# Patient Record
Sex: MALE | Race: WHITE | NOT HISPANIC OR LATINO | Employment: OTHER | URBAN - METROPOLITAN AREA
[De-identification: names, ages, dates, MRNs, and addresses within clinical notes are randomized per-mention and may not be internally consistent; named-entity substitution may affect disease eponyms.]

---

## 2020-10-13 ENCOUNTER — TELEPHONE (OUTPATIENT)
Dept: SCHEDULING | Facility: IMAGING CENTER | Age: 65
End: 2020-10-13

## 2020-10-28 ENCOUNTER — OFFICE VISIT (OUTPATIENT)
Dept: MEDICAL GROUP | Facility: LAB | Age: 65
End: 2020-10-28
Payer: COMMERCIAL

## 2020-10-28 VITALS
HEART RATE: 63 BPM | OXYGEN SATURATION: 96 % | SYSTOLIC BLOOD PRESSURE: 120 MMHG | TEMPERATURE: 98.9 F | DIASTOLIC BLOOD PRESSURE: 68 MMHG | WEIGHT: 225 LBS | HEIGHT: 74 IN | BODY MASS INDEX: 28.88 KG/M2 | RESPIRATION RATE: 13 BRPM

## 2020-10-28 DIAGNOSIS — I10 ESSENTIAL HYPERTENSION: ICD-10-CM

## 2020-10-28 DIAGNOSIS — E78.2 MIXED HYPERLIPIDEMIA: ICD-10-CM

## 2020-10-28 DIAGNOSIS — Z76.89 ENCOUNTER TO ESTABLISH CARE: ICD-10-CM

## 2020-10-28 DIAGNOSIS — I25.10 CORONARY ARTERY DISEASE INVOLVING NATIVE CORONARY ARTERY OF NATIVE HEART WITHOUT ANGINA PECTORIS: ICD-10-CM

## 2020-10-28 PROCEDURE — 99203 OFFICE O/P NEW LOW 30 MIN: CPT | Performed by: FAMILY MEDICINE

## 2020-10-28 RX ORDER — ASPIRIN 81 MG/1
81 TABLET ORAL DAILY
Qty: 90 TAB | Refills: 3
Start: 2020-10-28 | End: 2023-01-23 | Stop reason: SDUPTHER

## 2020-10-28 RX ORDER — ATORVASTATIN CALCIUM 40 MG/1
40 TABLET, FILM COATED ORAL NIGHTLY
COMMUNITY
End: 2020-11-17 | Stop reason: SDUPTHER

## 2020-10-28 RX ORDER — LISINOPRIL 5 MG/1
5 TABLET ORAL DAILY
COMMUNITY
End: 2020-11-17 | Stop reason: SDUPTHER

## 2020-10-28 SDOH — HEALTH STABILITY: MENTAL HEALTH: HOW OFTEN DO YOU HAVE A DRINK CONTAINING ALCOHOL?: MONTHLY OR LESS

## 2020-10-28 SDOH — HEALTH STABILITY: MENTAL HEALTH: HOW MANY STANDARD DRINKS CONTAINING ALCOHOL DO YOU HAVE ON A TYPICAL DAY?: 1 OR 2

## 2020-10-28 SDOH — HEALTH STABILITY: MENTAL HEALTH: HOW OFTEN DO YOU HAVE 6 OR MORE DRINKS ON ONE OCCASION?: NEVER

## 2020-10-28 ASSESSMENT — ENCOUNTER SYMPTOMS
FEVER: 0
CHILLS: 0
PALPITATIONS: 0
SHORTNESS OF BREATH: 0
BLURRED VISION: 0
VOMITING: 0
WHEEZING: 0
ABDOMINAL PAIN: 0
DIARRHEA: 0
NERVOUS/ANXIOUS: 0
HEADACHES: 0
NAUSEA: 0
DEPRESSION: 0
CONSTIPATION: 0
DIZZINESS: 0

## 2020-10-28 ASSESSMENT — PATIENT HEALTH QUESTIONNAIRE - PHQ9: CLINICAL INTERPRETATION OF PHQ2 SCORE: 0

## 2020-10-28 NOTE — LETTER
[facility]  Russell Lobo M.D.  [address]  [place] [address]  Fax: [phone]   Authorization for Release/Disclosure of   Protected Health Information   Name: GALA FELDMAN : 1955 SSN: [id number]   Address: [address] [place] [address] [address] [place] [address] Phone:    [phone] (home)    I authorize the entity listed below to release/disclose the PHI below to:   [facility]/Russell Lobo M.D. and Russell Lobo M.D.   Provider or Entity Name: [facility], Zip:[address] [place] [address]     [phone]    Fax:(   Reason for request: continuity of care   Information to be released:    [  ] LAST COLONOSCOPY,  including any PATH REPORT and follow-up  [  ] LAST FIT/COLOGUARD RESULT [  ] LAST DEXA  [  ] LAST MAMMOGRAM  [  ] LAST PAP  [  ] LAST LABS [  ] RETINA EXAM REPORT  [  ] IMMUNIZATION RECORDS  [ XXX ] Release all info      [  ] Check here and initial the line next to each item to release ALL health information INCLUDING  _____ Care and treatment for drug and / or alcohol abuse  _____ HIV testing, infection status, or AIDS  _____ Genetic Testing    DATES OF SERVICE OR TIME PERIOD TO BE DISCLOSED: _____________  I understand and acknowledge that:  * This Authorization may be revoked at any time by you in writing, except if your health information has already been used or disclosed.  * Your health information that will be used or disclosed as a result of you signing this authorization could be re-disclosed by the recipient. If this occurs, your re-disclosed health information may no longer be protected by State or Federal laws.  * You may refuse to sign this Authorization. Your refusal will not affect your ability to obtain treatment.  * This Authorization becomes effective upon signing and will  on (date) __________.      If no date is indicated, this Authorization will  one (1) year from the signature  date.    Name: Aldair Winter    Signature:   Date:     10/28/2020       PLEASE FAX REQUESTED RECORDS BACK TO: [phone]

## 2020-10-28 NOTE — PROGRESS NOTES
Aldair Winter is a 65 y.o. male here for   Chief Complaint   Patient presents with   • Establish Care     New to [facility]    • Heart Problem     Hx of heart attack (2019)       HPI:  Aldair is a very pleasant 65 y.o. male.     #Establish care   -Reviewed all past medical history, family history, social history.  -Reviewed all screening/vaccinations: Patient states that at [facility] had influenza, zoster, Tdap completed.  Colonoscopies completed 3 years ago.  Would not have records at this time but will request records.  -Diet and Exercise: Appropriate for age.  -Tobacco, alcohol, recreational drug use: Patient alcohol use.  Denies any tobacco, recreational drug use.  -Sexually active: Yes, monogamous with wife, no concerns regarding relationship.  -Occupation: Retired .    #CAD   -MI in Jan 2019, Stent was placed in LAD. Placed on Asprin, metoprolol, anti-platelet.  Antiplatelet was discontinued after 1 year.  Patient was experiencing significant side effects from the Toprol including lightheadedness, dizziness, hypotension, bradycardia.  -Since his MI back in 2019 has been working on better diet, exercise regimen.  Has lost some weight.  -Patient is on aspirin 81 mg daily as well as lisinopril for high blood pressure.  Is compliant with medications.  Denies any chest pain, shortness of breath, lightheadedness.    #Hypertension   #Chronic condition, new to me.  Currently being treated lisinopril 5 mg daily.  Compliant medication, denies any side effects including lightheadedness, dizziness, headache, change in vision, increased fatigue, syncope presyncopal episodes.  -Working on low-salt diet and daily exercise.    #Mixed hyperlipidemia:  -Chronic condition, new to me.  Currently taking Lipitor 40 mg daily.  Compliant medication, denies any side effects include increased fatigue, myalgias, headaches.      Current medicines (including changes today)  Current Outpatient Medications  "  Medication Sig Dispense Refill   • atorvastatin (LIPITOR) 40 MG Tab Take 40 mg by mouth every evening.     • ASPIRIN 81 PO Take 1 tablet by mouth every day.     • lisinopril (PRINIVIL) 5 MG Tab Take 5 mg by mouth every day.       No current facility-administered medications for this visit.      He  has a past medical history of Heart attack (HCC) (2019).  He  has no past surgical history on file.  Social History     Tobacco Use   • Smoking status: Never Smoker   • Smokeless tobacco: Never Used   Substance Use Topics   • Alcohol use: Yes     Frequency: Monthly or less     Drinks per session: 1 or 2     Binge frequency: Never   • Drug use: Not Currently     Social History     Social History Narrative   • Not on file     Family History   Problem Relation Age of Onset   • Depression Mother    • Cancer Father         Prostate cancer   • No Known Problems Sister    • No Known Problems Brother    • No Known Problems Sister      Family Status   Relation Name Status   • Mo  Alive   • Fa   at age 64   • Sis  Alive   • Bro  Alive   • Sis  Alive         ROS  Review of Systems   Constitutional: Negative for chills and fever.   HENT: Negative for hearing loss.    Eyes: Negative for blurred vision.   Respiratory: Negative for shortness of breath and wheezing.    Cardiovascular: Negative for chest pain and palpitations.   Gastrointestinal: Negative for abdominal pain, constipation, diarrhea, nausea and vomiting.   Neurological: Negative for dizziness and headaches.   Psychiatric/Behavioral: Negative for depression. The patient is not nervous/anxious.         Objective:     /68 (BP Location: Right arm, Patient Position: Sitting, BP Cuff Size: Adult)   Pulse 63   Temp 37.2 °C (98.9 °F) (Temporal)   Resp 13   Ht 1.87 m (6' 1.62\")   Wt 102.1 kg (225 lb)   SpO2 96%  Body mass index is 29.19 kg/m².  Physical Exam:    Constitutional: Alert, no distress.  Skin: Warm, dry, good turgor, no rashes in visible areas.  Eye: " Equal, round and reactive, conjunctiva clear, lids normal.  ENMT: Lips without lesions, good dentition, oropharynx clear. TM's pearly gray with normal light reflexes bilaterally  Neck: Trachea midline, no masses, no thyromegaly. No cervical or supraclavicular lymphadenopathy.  Respiratory: Unlabored respiratory effort, lungs clear to auscultation bilaterally, no wheezes, rales, or ronchi.  Cardiovascular: Normal S1, S2, RRR, no murmur, no edema.  Abdomen: Soft, non-tender, no masses, no hepatosplenomegaly.  Psych: Alert and oriented x3, normal affect and mood.        Assessment and Plan:   The following treatment plan was discussed    1. Encounter to establish care  -All questions concerns were answered at this time.  -Vaccinations/screenings needed at this time: Unsure given paucity of records.  We will do records request with [facility].  -Labs reviewed, will order new labs as below.  -Discussed the importance of a healthy, Mediterranean style diet, routine exercise regimen.  -Discussed general safety measures including seatbelts, helmets, avoidance of smoking, sunscreen, hydration.  -Follow-up for general physical exam on a yearly basis, sooner if needed.  - Comp Metabolic Panel; Future  - CBC WITHOUT DIFFERENTIAL; Future    2. Coronary artery disease involving native coronary artery of native heart without angina pectoris  -Status: Stable.  Continue lisinopril, aspirin.  -Continue with appropriate diet and exercise regimen.  -Return cautions were given, follow-up as needed.  - aspirin 81 MG EC tablet; Take 1 Tab by mouth every day.  Dispense: 90 Tab; Refill: 3    3. Essential hypertension  -Status: Stable we will continue with lisinopril.  Will check labs as below.  -Discussed the importance of healthy diet and exercise.  - Comp Metabolic Panel; Future  - CBC WITHOUT DIFFERENTIAL; Future    4. Mixed hyperlipidemia  -Status: Stable.  Will continue with atorvastatin.  Will check cholesterol levels at this time.   Discussed the importance of a healthy diet and exercise regimen.  - Lipid Profile; Future      Records requested.  Followup: Return in about 1 year (around 10/28/2021), or if symptoms worsen or fail to improve.         This note was created using voice recognition software. I have made every reasonable attempt to correct errors, however, I do anticipate some grammatical errors.

## 2020-10-29 ENCOUNTER — TELEPHONE (OUTPATIENT)
Dept: MEDICAL GROUP | Facility: LAB | Age: 65
End: 2020-10-29

## 2020-10-29 NOTE — TELEPHONE ENCOUNTER
Phone Number Called: [phone]    Call outcome: Spoke to medical records department at [facility]    Message: Called and spoke to medical records gave them our mailing address to the medical group office.

## 2020-10-31 ENCOUNTER — HOSPITAL ENCOUNTER (OUTPATIENT)
Dept: LAB | Facility: MEDICAL CENTER | Age: 65
End: 2020-10-31
Attending: FAMILY MEDICINE
Payer: COMMERCIAL

## 2020-10-31 DIAGNOSIS — Z76.89 ENCOUNTER TO ESTABLISH CARE: ICD-10-CM

## 2020-10-31 DIAGNOSIS — E78.2 MIXED HYPERLIPIDEMIA: ICD-10-CM

## 2020-10-31 DIAGNOSIS — I10 ESSENTIAL HYPERTENSION: ICD-10-CM

## 2020-10-31 LAB
ALBUMIN SERPL BCP-MCNC: 4.6 G/DL (ref 3.2–4.9)
ALBUMIN/GLOB SERPL: 2 G/DL
ALP SERPL-CCNC: 52 U/L (ref 30–99)
ALT SERPL-CCNC: 47 U/L (ref 2–50)
ANION GAP SERPL CALC-SCNC: 10 MMOL/L (ref 7–16)
AST SERPL-CCNC: 31 U/L (ref 12–45)
BILIRUB SERPL-MCNC: 1.6 MG/DL (ref 0.1–1.5)
BUN SERPL-MCNC: 18 MG/DL (ref 8–22)
CALCIUM SERPL-MCNC: 9.9 MG/DL (ref 8.5–10.5)
CHLORIDE SERPL-SCNC: 101 MMOL/L (ref 96–112)
CHOLEST SERPL-MCNC: 111 MG/DL (ref 100–199)
CO2 SERPL-SCNC: 27 MMOL/L (ref 20–33)
CREAT SERPL-MCNC: 0.96 MG/DL (ref 0.5–1.4)
ERYTHROCYTE [DISTWIDTH] IN BLOOD BY AUTOMATED COUNT: 46.9 FL (ref 35.9–50)
FASTING STATUS PATIENT QL REPORTED: NORMAL
GLOBULIN SER CALC-MCNC: 2.3 G/DL (ref 1.9–3.5)
GLUCOSE SERPL-MCNC: 108 MG/DL (ref 65–99)
HCT VFR BLD AUTO: 47.9 % (ref 42–52)
HDLC SERPL-MCNC: 45 MG/DL
HGB BLD-MCNC: 16.5 G/DL (ref 14–18)
LDLC SERPL CALC-MCNC: 56 MG/DL
MCH RBC QN AUTO: 35.2 PG (ref 27–33)
MCHC RBC AUTO-ENTMCNC: 34.4 G/DL (ref 33.7–35.3)
MCV RBC AUTO: 102.1 FL (ref 81.4–97.8)
PLATELET # BLD AUTO: 198 K/UL (ref 164–446)
PMV BLD AUTO: 9.8 FL (ref 9–12.9)
POTASSIUM SERPL-SCNC: 4.7 MMOL/L (ref 3.6–5.5)
PROT SERPL-MCNC: 6.9 G/DL (ref 6–8.2)
RBC # BLD AUTO: 4.69 M/UL (ref 4.7–6.1)
SODIUM SERPL-SCNC: 138 MMOL/L (ref 135–145)
TRIGL SERPL-MCNC: 52 MG/DL (ref 0–149)
WBC # BLD AUTO: 5.8 K/UL (ref 4.8–10.8)

## 2020-10-31 PROCEDURE — 36415 COLL VENOUS BLD VENIPUNCTURE: CPT

## 2020-10-31 PROCEDURE — 80053 COMPREHEN METABOLIC PANEL: CPT

## 2020-10-31 PROCEDURE — 85027 COMPLETE CBC AUTOMATED: CPT

## 2020-10-31 PROCEDURE — 80061 LIPID PANEL: CPT

## 2020-11-17 DIAGNOSIS — E78.2 MIXED HYPERLIPIDEMIA: ICD-10-CM

## 2020-11-17 DIAGNOSIS — I10 ESSENTIAL HYPERTENSION: ICD-10-CM

## 2020-11-17 NOTE — TELEPHONE ENCOUNTER
Received request via: Patient    Was the patient seen in the last year in this department? Yes LOV 10/28/2020    Does the patient have an active prescription (recently filled or refills available) for medication(s) requested? No

## 2020-11-17 NOTE — TELEPHONE ENCOUNTER
Prescription Question    Aldair Winter Russell T, M.D. 4 minutes ago (10:23 AM)        I am ready to fill my 2 percriptions . They do not seem to be available for refill at this time.  C an you approve refills on the Lipitor and Lisinipril.

## 2020-11-18 RX ORDER — LISINOPRIL 5 MG/1
5 TABLET ORAL DAILY
Qty: 90 TAB | Refills: 3 | Status: SHIPPED | OUTPATIENT
Start: 2020-11-18 | End: 2021-02-16

## 2020-11-18 RX ORDER — ATORVASTATIN CALCIUM 40 MG/1
40 TABLET, FILM COATED ORAL DAILY
Qty: 90 TAB | Refills: 3 | Status: SHIPPED | OUTPATIENT
Start: 2020-11-18 | End: 2021-02-16

## 2021-01-04 ENCOUNTER — TELEPHONE (OUTPATIENT)
Dept: MEDICAL GROUP | Facility: LAB | Age: 66
End: 2021-01-04

## 2021-01-04 DIAGNOSIS — E78.2 MIXED HYPERLIPIDEMIA: ICD-10-CM

## 2021-01-04 DIAGNOSIS — I10 ESSENTIAL HYPERTENSION: ICD-10-CM

## 2021-01-04 RX ORDER — ATORVASTATIN CALCIUM 40 MG/1
40 TABLET, FILM COATED ORAL DAILY
Qty: 90 TAB | Refills: 3 | Status: CANCELLED | OUTPATIENT
Start: 2021-01-04 | End: 2021-04-04

## 2021-01-04 RX ORDER — LISINOPRIL 5 MG/1
5 TABLET ORAL DAILY
Qty: 90 TAB | Refills: 3 | Status: CANCELLED | OUTPATIENT
Start: 2021-01-04 | End: 2021-04-04

## 2021-01-04 NOTE — TELEPHONE ENCOUNTER
----- Message from Aldair Winter sent at 1/3/2021  8:52 AM PST -----  Regarding: Prescription Question  Contact: [phone]  Hello,  I picked up two prescriptions in November and according to this website both were for a 90 day refill. However, this was not the case. I only received a 30 day supply and now have 3 atorvastavin pills remaining and 10 lisinopril pills remaining.   Can you please approve refills and change the prescription to 90 day supply and mail order?     Thank you,  Jean Winter

## 2021-02-01 ENCOUNTER — APPOINTMENT (OUTPATIENT)
Dept: RADIOLOGY | Facility: MEDICAL CENTER | Age: 66
End: 2021-02-01
Attending: EMERGENCY MEDICINE
Payer: COMMERCIAL

## 2021-02-01 ENCOUNTER — HOSPITAL ENCOUNTER (EMERGENCY)
Facility: MEDICAL CENTER | Age: 66
End: 2021-02-01
Attending: EMERGENCY MEDICINE
Payer: COMMERCIAL

## 2021-02-01 VITALS
SYSTOLIC BLOOD PRESSURE: 140 MMHG | RESPIRATION RATE: 17 BRPM | HEIGHT: 73 IN | TEMPERATURE: 98.2 F | DIASTOLIC BLOOD PRESSURE: 87 MMHG | OXYGEN SATURATION: 93 % | BODY MASS INDEX: 31.18 KG/M2 | HEART RATE: 58 BPM | WEIGHT: 235.23 LBS

## 2021-02-01 DIAGNOSIS — S16.1XXA STRAIN OF NECK MUSCLE, INITIAL ENCOUNTER: ICD-10-CM

## 2021-02-01 PROCEDURE — 72125 CT NECK SPINE W/O DYE: CPT

## 2021-02-01 PROCEDURE — 99283 EMERGENCY DEPT VISIT LOW MDM: CPT

## 2021-02-01 ASSESSMENT — FIBROSIS 4 INDEX: FIB4 SCORE: 1.44

## 2021-02-01 NOTE — ED PROVIDER NOTES
ED Provider Note    CHIEF COMPLAINT  Chief Complaint   Patient presents with   • Neck Pain     header skiing this am on the hard pack, no numbness or tingling in his arms       HPI  Aldair Winter is a 65 y.o. male who presents with neck pain.  The patient wrecked skiing this morning.  He states that he fell forward and struck his head and had a hyperflexion injury to his neck.  He presents with neck discomfort.  He did have a helmet on and does not have a headache.  He states he has pain diffusely located throughout the cervical spine but mostly in the left paraspinal muscles.  The patient does not have any paresthesias nor functional loss of his extremities.  He is unaware of any other injuries.  Does not have any radicular pain.    REVIEW OF SYSTEMS  See HPI for further details. All other systems are negative.     PAST MEDICAL HISTORY  Past Medical History:   Diagnosis Date   • Heart attack (HCC) 2019       FAMILY HISTORY  [unfilled]    SOCIAL HISTORY  Social History     Socioeconomic History   • Marital status:      Spouse name: Not on file   • Number of children: Not on file   • Years of education: Not on file   • Highest education level: Not on file   Occupational History   • Not on file   Social Needs   • Financial resource strain: Not on file   • Food insecurity     Worry: Not on file     Inability: Not on file   • Transportation needs     Medical: Not on file     Non-medical: Not on file   Tobacco Use   • Smoking status: Never Smoker   • Smokeless tobacco: Never Used   Substance and Sexual Activity   • Alcohol use: Yes     Frequency: Monthly or less     Drinks per session: 1 or 2     Binge frequency: Never   • Drug use: Not Currently   • Sexual activity: Not on file   Lifestyle   • Physical activity     Days per week: Not on file     Minutes per session: Not on file   • Stress: Not on file   Relationships   • Social connections     Talks on phone: Not on file     Gets together: Not on file      "Attends Denominational service: Not on file     Active member of club or organization: Not on file     Attends meetings of clubs or organizations: Not on file     Relationship status: Not on file   • Intimate partner violence     Fear of current or ex partner: Not on file     Emotionally abused: Not on file     Physically abused: Not on file     Forced sexual activity: Not on file   Other Topics Concern   • Not on file   Social History Narrative   • Not on file       SURGICAL HISTORY  No past surgical history on file.    CURRENT MEDICATIONS  Home Medications    **Home medications have not yet been reviewed for this encounter**         ALLERGIES  No Known Allergies    PHYSICAL EXAM  VITAL SIGNS: /86   Pulse (!) 59   Temp 36.9 °C (98.4 °F) (Temporal)   Resp 16   Ht 1.854 m (6' 1\")   Wt 107 kg (235 lb 3.7 oz)   SpO2 96%   BMI 31.03 kg/m²       Constitutional: Mild acute distress, Non-toxic appearance.   HENT: Normocephalic, Atraumatic, Bilateral external ears normal, Oropharynx moist, No oral exudates, Nose normal.   Eyes: PERRLA, EOMI, Conjunctiva normal, No discharge.   Neck: Diffuse cervical discomfort without midline step-offs  Lymphatic: No lymphadenopathy noted.   Cardiovascular: Normal heart rate, Normal rhythm, No murmurs, No rubs, No gallops.   Thorax & Lungs: Normal breath sounds, No respiratory distress, No wheezing, No chest tenderness.   Abdomen: Bowel sounds normal, Soft, No tenderness, No masses, No pulsatile masses.   Skin: Warm, Dry, No erythema, No rash.   Back: No tenderness, No CVA tenderness.    Extremities: Intact distal pulses, No edema, No tenderness, No cyanosis, No clubbing.   Neurologic: GCS 15  Psychiatric: Affect normal, Judgment normal, Mood normal.     RADIOLOGY/PROCEDURES  CT-CSPINE WITHOUT PLUS RECONS   Final Result      1.  No acute fracture is identified.      2.  Multilevel degenerative disc disease and facet arthropathy.      3.  Atherosclerosis.            COURSE & MEDICAL " DECISION MAKING  Pertinent Labs & Imaging studies reviewed. (See chart for details)  This a 65-year-old gentleman who presents with cervical neck pain after a skiing accident.  CT scan was performed to make sure there is no evidence of a compression deformity.  The imaging studies were negative.  Clinically does not have any evidence of ligamental instability.  I suspect this is more from a flexion soft tissue injury.  The patient is neurologically intact at this time.  The patient will utilize ice, Motrin, and Tylenol.  If he develops weakness to his upper extremities or increased pain he will return for repeat examination.  Clinically does not have any evidence of a significant head injury.    FINAL IMPRESSION  1.  Cervical strain    Disposition  Patient will be discharged in stable condition    Electronically signed by: Antonino Griffin M.D., 2/1/2021 3:28 PM

## 2021-02-01 NOTE — ED TRIAGE NOTES
Chief Complaint   Patient presents with   • Neck Pain     header skiing this am on the hard pack, no numbness or tingling in his arms   Hard collar applied in triage. Then the hard collar removed as this pt could not handle it.

## 2021-02-01 NOTE — DISCHARGE INSTRUCTIONS
Utilize ice, Motrin, and Tylenol as discussed    Turn for increased pain or weakness to your upper extremities

## 2021-02-01 NOTE — ED NOTES
Discharge instructions given and discussed. Pt educated to come back to ER for new or worsening symptoms and follow up with PCP as instructed. Pt verbalized understanding. VSS. Pt  Discharged in stable condition.

## 2021-03-15 DIAGNOSIS — Z23 NEED FOR VACCINATION: ICD-10-CM

## 2021-03-23 ENCOUNTER — IMMUNIZATION (OUTPATIENT)
Dept: FAMILY PLANNING/WOMEN'S HEALTH CLINIC | Facility: IMMUNIZATION CENTER | Age: 66
End: 2021-03-23
Attending: INTERNAL MEDICINE
Payer: COMMERCIAL

## 2021-03-23 DIAGNOSIS — Z23 NEED FOR VACCINATION: ICD-10-CM

## 2021-03-23 DIAGNOSIS — Z23 ENCOUNTER FOR VACCINATION: Primary | ICD-10-CM

## 2021-03-23 PROCEDURE — 91300 PFIZER SARS-COV-2 VACCINE: CPT | Performed by: INTERNAL MEDICINE

## 2021-03-23 PROCEDURE — 0001A PFIZER SARS-COV-2 VACCINE: CPT | Performed by: INTERNAL MEDICINE

## 2021-03-31 ENCOUNTER — HOSPITAL ENCOUNTER (OUTPATIENT)
Dept: LAB | Facility: MEDICAL CENTER | Age: 66
End: 2021-03-31
Attending: FAMILY MEDICINE
Payer: COMMERCIAL

## 2021-03-31 ENCOUNTER — OFFICE VISIT (OUTPATIENT)
Dept: MEDICAL GROUP | Facility: LAB | Age: 66
End: 2021-03-31
Payer: COMMERCIAL

## 2021-03-31 VITALS
HEIGHT: 74 IN | OXYGEN SATURATION: 97 % | WEIGHT: 232 LBS | SYSTOLIC BLOOD PRESSURE: 106 MMHG | BODY MASS INDEX: 29.77 KG/M2 | HEART RATE: 66 BPM | DIASTOLIC BLOOD PRESSURE: 62 MMHG | TEMPERATURE: 98 F | RESPIRATION RATE: 13 BRPM

## 2021-03-31 DIAGNOSIS — R73.09 ELEVATED RANDOM BLOOD GLUCOSE LEVEL: ICD-10-CM

## 2021-03-31 DIAGNOSIS — R21 RASH AND NONSPECIFIC SKIN ERUPTION: ICD-10-CM

## 2021-03-31 DIAGNOSIS — Z12.5 ENCOUNTER FOR SCREENING FOR MALIGNANT NEOPLASM OF PROSTATE: ICD-10-CM

## 2021-03-31 DIAGNOSIS — Z12.12 SCREENING FOR COLORECTAL CANCER: ICD-10-CM

## 2021-03-31 DIAGNOSIS — Z12.11 SCREENING FOR COLORECTAL CANCER: ICD-10-CM

## 2021-03-31 LAB
EST. AVERAGE GLUCOSE BLD GHB EST-MCNC: 117 MG/DL
HBA1C MFR BLD: 5.7 % (ref 4–5.6)
PSA SERPL-MCNC: 0.25 NG/ML (ref 0–4)

## 2021-03-31 PROCEDURE — 83036 HEMOGLOBIN GLYCOSYLATED A1C: CPT

## 2021-03-31 PROCEDURE — 84153 ASSAY OF PSA TOTAL: CPT

## 2021-03-31 PROCEDURE — 99214 OFFICE O/P EST MOD 30 MIN: CPT | Performed by: FAMILY MEDICINE

## 2021-03-31 PROCEDURE — 36415 COLL VENOUS BLD VENIPUNCTURE: CPT

## 2021-03-31 RX ORDER — ATORVASTATIN CALCIUM 40 MG/1
40 TABLET, FILM COATED ORAL
COMMUNITY
Start: 2021-03-04 | End: 2021-04-22 | Stop reason: SDUPTHER

## 2021-03-31 RX ORDER — TRIAMCINOLONE ACETONIDE 1 MG/G
CREAM TOPICAL
Qty: 45 G | Refills: 3 | Status: SHIPPED
Start: 2021-03-31 | End: 2021-07-28

## 2021-03-31 RX ORDER — LISINOPRIL 5 MG/1
5 TABLET ORAL
COMMUNITY
Start: 2021-03-04 | End: 2021-04-22 | Stop reason: SDUPTHER

## 2021-03-31 ASSESSMENT — ENCOUNTER SYMPTOMS
SHORTNESS OF BREATH: 0
PALPITATIONS: 0
FEVER: 0
WHEEZING: 0
CHILLS: 0

## 2021-03-31 ASSESSMENT — FIBROSIS 4 INDEX: FIB4 SCORE: 1.44

## 2021-03-31 NOTE — PROGRESS NOTES
"Subjective:   Aldair Winter is a 65 y.o. male here today for   Chief Complaint   Patient presents with   • Rash     X 3 weeks, Bilateral on thighs using hydrocloride and zinc cream        #Rash:  -For the last 3 weeks patient has noticed a erythematous, pruritic patch located inner aspect of his upper right leg and he has been using over-the-counter hydrochlorothiazide and zinc cream which is only helped slightly.  He states the area is extremely pruritic, slightly worse when rashing.  He has moved from [facility] and has noticed his skin becoming extremely more dry since moving to [place].    #Elevated blood glucose level:  -No previous appointment labs were completed showing elevated blood glucose level.  Him and his wife been working on improving diet and exercise.  Is asymptomatic at this time.  Requesting repeat blood work.    #Health maintenance:  -Patient is due for PSA, colonoscopy which she will complete at this time.      No Known Allergies      Current medicines (including changes today)  Current Outpatient Medications   Medication Sig Dispense Refill   • atorvastatin (LIPITOR) 40 MG Tab Take 40 mg by mouth every day.     • lisinopril (PRINIVIL) 5 MG Tab Take 5 mg by mouth every day.     • aspirin 81 MG EC tablet Take 1 Tab by mouth every day. 90 Tab 3     No current facility-administered medications for this visit.     He  has a past medical history of Heart attack (HCC) (2019).    ROS   Review of Systems   Constitutional: Negative for chills and fever.   Respiratory: Negative for shortness of breath and wheezing.    Cardiovascular: Negative for chest pain and palpitations.   Skin: Positive for itching and rash.        Objective:     Physical Exam:  /62   Pulse 66   Temp 36.7 °C (98 °F) (Temporal)   Resp 13   Ht 1.87 m (6' 1.62\")   Wt 105 kg (232 lb)   SpO2 97%  Body mass index is 30.09 kg/m².   Constitutional: Alert, no distress.  Skin: Warm, dry, good turgor.  Erythematous patch on the " inner aspect of the upper right leg measuring 6 x 3 cm.  Excoriation marks were also noted.  Respiratory: Unlabored respiratory effortCardiovascular: Normal S1, S2, no murmur, no edema.  Psych: Alert and oriented x3, normal affect and mood.    Assessment and Plan:     1. Rash and nonspecific skin eruption  -Uncertain etiology at this time; however, given presenting signs and symptoms is controlled possible eczema.  We will treated the this time with prescription steroid cream to use twice daily.  Discussed use of emollient creams as well.  Patient will follow up in 1 to 2 weeks if no improvement is noted.  - triamcinolone acetonide (KENALOG) 0.1 % Cream; Apply to the affected area on leg twice a day for 1-2 weeks.  Dispense: 45 g; Refill: 3    2. Encounter for screening for malignant neoplasm of prostate  - PROSTATE SPECIFIC AG SCREENING; Future    3. Elevated random blood glucose level  -Continue work on appropriate diet, exercise regimen.  Will recheck hemoglobin A1c at this time.  - HEMOGLOBIN A1C; Future    4. Screening for colorectal cancer  - REFERRAL TO GI FOR COLONOSCOPY      Followup: Return if symptoms worsen or fail to improve.         PLEASE NOTE: This dictation was created using voice recognition software. I have made every reasonable attempt to correct obvious errors, but I expect that there are errors of grammar and possibly content that I did not discover before finalizing the note.

## 2021-04-14 ENCOUNTER — OFFICE VISIT (OUTPATIENT)
Dept: MEDICAL GROUP | Facility: LAB | Age: 66
End: 2021-04-14
Payer: COMMERCIAL

## 2021-04-14 VITALS
HEART RATE: 56 BPM | HEIGHT: 74 IN | WEIGHT: 232 LBS | SYSTOLIC BLOOD PRESSURE: 104 MMHG | DIASTOLIC BLOOD PRESSURE: 62 MMHG | RESPIRATION RATE: 13 BRPM | TEMPERATURE: 97 F | BODY MASS INDEX: 29.77 KG/M2 | OXYGEN SATURATION: 97 %

## 2021-04-14 DIAGNOSIS — I25.10 CORONARY ARTERY DISEASE INVOLVING NATIVE CORONARY ARTERY OF NATIVE HEART WITHOUT ANGINA PECTORIS: ICD-10-CM

## 2021-04-14 DIAGNOSIS — R73.03 PREDIABETES: ICD-10-CM

## 2021-04-14 PROCEDURE — 99213 OFFICE O/P EST LOW 20 MIN: CPT | Performed by: FAMILY MEDICINE

## 2021-04-14 ASSESSMENT — ENCOUNTER SYMPTOMS
WHEEZING: 0
FEVER: 0
PALPITATIONS: 0
CHILLS: 0
HEARTBURN: 0
HEADACHES: 0
SHORTNESS OF BREATH: 0
DIZZINESS: 0
BLURRED VISION: 0
NERVOUS/ANXIOUS: 0
DEPRESSION: 0

## 2021-04-14 ASSESSMENT — FIBROSIS 4 INDEX: FIB4 SCORE: 1.44

## 2021-04-14 ASSESSMENT — PATIENT HEALTH QUESTIONNAIRE - PHQ9: CLINICAL INTERPRETATION OF PHQ2 SCORE: 0

## 2021-04-14 NOTE — PATIENT INSTRUCTIONS

## 2021-04-14 NOTE — PROGRESS NOTES
Subjective:   Aldair Winter is a 65 y.o. male here today for   Chief Complaint   Patient presents with   • Nutrition Counseling       #Elevated hemoglobin A1c:  -Patient has had a recently elevated hemoglobin A1c of 5.7% seen in the prediabetic category.  He states him and his wife both have some issues with elevated blood glucose have been working on improving diet and exercise regimen.  He states he has been doing aerobic activity 30 to 40 minutes daily.  Denies any fatigue, polydipsia, polyuria, or any other symptoms at this time.  He is here today to discuss nutrition counseling.    #CAD:  -Status post stent placement 2019.  Currently on aspirin, Lipitor daily.  He is doing well, denies any symptoms at this time including chest pain, shortness of breath, dizziness, lightheadedness.  He is currently not following up with a cardiologist but feels like he is doing much better after cardiac rehab and continue to work on weight loss.  He has no questions or concerns at this time.      No Known Allergies      Current medicines (including changes today)  Current Outpatient Medications   Medication Sig Dispense Refill   • atorvastatin (LIPITOR) 40 MG Tab Take 40 mg by mouth every day.     • lisinopril (PRINIVIL) 5 MG Tab Take 5 mg by mouth every day.     • triamcinolone acetonide (KENALOG) 0.1 % Cream Apply to the affected area on leg twice a day for 1-2 weeks. 45 g 3   • aspirin 81 MG EC tablet Take 1 Tab by mouth every day. 90 Tab 3     No current facility-administered medications for this visit.     He  has a past medical history of Heart attack (HCC) (2019).    ROS   Review of Systems   Constitutional: Negative for chills and fever.   HENT: Negative for hearing loss.    Eyes: Negative for blurred vision.   Respiratory: Negative for shortness of breath and wheezing.    Cardiovascular: Negative for chest pain and palpitations.   Gastrointestinal: Negative for heartburn.   Neurological: Negative for dizziness and  "headaches.   Psychiatric/Behavioral: Negative for depression. The patient is not nervous/anxious.         Objective:     Physical Exam:  /62   Pulse (!) 56   Temp 36.1 °C (97 °F) (Temporal)   Resp 13   Ht 1.87 m (6' 1.62\")   Wt 105 kg (232 lb)   SpO2 97%  Body mass index is 30.09 kg/m².   Constitutional: Alert, no distress.  Skin: Warm, dry, good turgor, no rashes in visible areas.  Eye: Equal, round and reactive, conjunctiva clear, lids normal.  ENMT: TM's clear bilaterally, lips without lesions  Respiratory: Unlabored respiratory effort, no audible wheezingPsych: Alert and oriented x3, normal affect and mood.    Assessment and Plan:     1. Prediabetes  -Stable.  -Discussed appropriate low-carb, high-protein, high-fiber diet and limiting carbohydrates to 1-2 servings of meal.  Also discussed working on calorie restriction for weight loss.  Reviewed all labs and vitals with patient.  Also discussed most continuation of daily aerobic activity.  We will recheck hemoglobin A1c in 4 to 5 months, will follow up with patient with results.  -Patient will follow up with the is not seen weight loss or changes in diet that he was looking for.  - HEMOGLOBIN A1C; Future    2. Coronary artery disease involving native coronary artery of native heart without angina pectoris  -Stable.  We will continue treatment with aspirin, atorvastatin.  Patient is asymptomatic at this time.  I discussed the possibility of referring to cardiology for further follow-up; however, patient declined at this time and will continue following up with me on a regular basis.  Strict return precautions were given, patient will follow-up as needed.      Followup: Return in about 6 months (around 10/14/2021).         PLEASE NOTE: This dictation was created using voice recognition software. I have made every reasonable attempt to correct obvious errors, but I expect that there are errors of grammar and possibly content that I did not discover " before finalizing the note.

## 2021-04-15 ENCOUNTER — IMMUNIZATION (OUTPATIENT)
Dept: FAMILY PLANNING/WOMEN'S HEALTH CLINIC | Facility: IMMUNIZATION CENTER | Age: 66
End: 2021-04-15
Attending: INTERNAL MEDICINE
Payer: COMMERCIAL

## 2021-04-15 DIAGNOSIS — Z23 ENCOUNTER FOR VACCINATION: Primary | ICD-10-CM

## 2021-04-15 PROCEDURE — 91300 PFIZER SARS-COV-2 VACCINE: CPT | Performed by: INTERNAL MEDICINE

## 2021-04-15 PROCEDURE — 0002A PFIZER SARS-COV-2 VACCINE: CPT | Performed by: INTERNAL MEDICINE

## 2021-04-22 RX ORDER — LISINOPRIL 5 MG/1
5 TABLET ORAL
Qty: 90 TABLET | Refills: 3 | Status: SHIPPED | OUTPATIENT
Start: 2021-04-22 | End: 2022-04-27

## 2021-04-22 RX ORDER — ATORVASTATIN CALCIUM 40 MG/1
40 TABLET, FILM COATED ORAL
Qty: 90 TABLET | Refills: 3 | Status: SHIPPED | OUTPATIENT
Start: 2021-04-22 | End: 2022-04-27

## 2021-04-22 NOTE — TELEPHONE ENCOUNTER
Received request via: Pharmacy/ MAIL ORDER    Was the patient seen in the last year in this department? Yes  4/14/21  Does the patient have an active prescription (recently filled or refills available) for medication(s) requested? No

## 2021-04-22 NOTE — TELEPHONE ENCOUNTER
Received request via: Pharmacy    Was the patient seen in the last year in this department? Yes  4/14/21  Does the patient have an active prescription (recently filled or refills available) for medication(s) requested? No

## 2021-06-18 ENCOUNTER — PATIENT MESSAGE (OUTPATIENT)
Dept: MEDICAL GROUP | Facility: LAB | Age: 66
End: 2021-06-18

## 2021-06-18 DIAGNOSIS — I25.2 HISTORY OF MI (MYOCARDIAL INFARCTION): ICD-10-CM

## 2021-06-18 DIAGNOSIS — R73.09 ELEVATED RANDOM BLOOD GLUCOSE LEVEL: ICD-10-CM

## 2021-07-07 ENCOUNTER — TELEPHONE (OUTPATIENT)
Dept: CARDIOLOGY | Facility: MEDICAL CENTER | Age: 66
End: 2021-07-07

## 2021-07-07 NOTE — TELEPHONE ENCOUNTER
Patient called back and number for [facility] is [phone].    Thank you,    Ana Rosa RUDOLPH

## 2021-07-07 NOTE — TELEPHONE ENCOUNTER
Called patient. He said he does have some records at [facility] in [place] and will give me a call back with the information to call and get them.

## 2021-07-14 ENCOUNTER — APPOINTMENT (OUTPATIENT)
Dept: LAB | Facility: MEDICAL CENTER | Age: 66
End: 2021-07-14
Payer: COMMERCIAL

## 2021-07-22 ENCOUNTER — TELEPHONE (OUTPATIENT)
Dept: CARDIOLOGY | Facility: MEDICAL CENTER | Age: 66
End: 2021-07-22

## 2021-07-22 NOTE — TELEPHONE ENCOUNTER
On 7/07/2021 I sent STAT email to request medical records from [facility] ( the only way they will do it)    [email or web address]    Called again on 7/21/2021  [phone]    They stated they needed 3 forms of ID.   I gave his old address from his ID in chart with the [place] address. And then emailed request again with 3 forms of ID.

## 2021-07-23 ENCOUNTER — OFFICE VISIT (OUTPATIENT)
Dept: MEDICAL GROUP | Facility: LAB | Age: 66
End: 2021-07-23
Payer: COMMERCIAL

## 2021-07-23 VITALS
WEIGHT: 232 LBS | OXYGEN SATURATION: 98 % | SYSTOLIC BLOOD PRESSURE: 132 MMHG | BODY MASS INDEX: 29.77 KG/M2 | HEIGHT: 74 IN | HEART RATE: 48 BPM | DIASTOLIC BLOOD PRESSURE: 80 MMHG | TEMPERATURE: 98.7 F

## 2021-07-23 DIAGNOSIS — K64.9 HEMORRHOIDS, UNSPECIFIED HEMORRHOID TYPE: ICD-10-CM

## 2021-07-23 PROCEDURE — 99214 OFFICE O/P EST MOD 30 MIN: CPT | Performed by: FAMILY MEDICINE

## 2021-07-23 RX ORDER — HYDROCORTISONE 25 MG/G
CREAM TOPICAL
Qty: 28 G | Refills: 1 | Status: SHIPPED | OUTPATIENT
Start: 2021-07-23 | End: 2021-07-28

## 2021-07-23 RX ORDER — HYDROCORTISONE ACETATE 25 MG/1
25 SUPPOSITORY RECTAL EVERY 12 HOURS
Qty: 24 SUPPOSITORY | Refills: 0 | Status: SHIPPED | OUTPATIENT
Start: 2021-07-23 | End: 2021-07-23

## 2021-07-23 ASSESSMENT — ENCOUNTER SYMPTOMS
ABDOMINAL PAIN: 0
CONSTIPATION: 0
NERVOUS/ANXIOUS: 0
FEVER: 0
DIARRHEA: 0
VOMITING: 0
BLOOD IN STOOL: 0
DEPRESSION: 0
CHILLS: 0

## 2021-07-23 ASSESSMENT — FIBROSIS 4 INDEX: FIB4 SCORE: 1.46

## 2021-07-23 NOTE — PROGRESS NOTES
Subjective:   Aldair Winter is a 66 y.o. male here today for   Chief Complaint   Patient presents with   • Results     Discuss results of colonoscopy    • Hemorrhoids     After colonoscopy, developed some discomfort, noticed some blood, using otc cream and supp...        #Hemorrhoid:  -Patient recently had colonoscopy.  The day after he went for 34 mountain bike ride.  The day after that he started experiencing exquisite pain, tenderness in rectal area.  He states that the first day was so significant he is having difficulty sitting down.  He states that soon after that he started having some bleeding from a bulge/mass that he noted around his anal sphincter.  He states that the pain has improved slightly but continues to have some bleeding.  Has been using Preparation H, ice only mild improvement.  -Patient has had colonoscopy completed and during there they did find results of internal hemorrhoids.  -Patient states that the pain continues is here today for further evaluation.      No Known Allergies      Current medicines (including changes today)  Current Outpatient Medications   Medication Sig Dispense Refill   • atorvastatin (LIPITOR) 40 MG Tab Take 1 tablet by mouth every day. 90 tablet 3   • lisinopril (PRINIVIL) 5 MG Tab Take 1 tablet by mouth every day. 90 tablet 3   • triamcinolone acetonide (KENALOG) 0.1 % Cream Apply to the affected area on leg twice a day for 1-2 weeks. 45 g 3   • aspirin 81 MG EC tablet Take 1 Tab by mouth every day. 90 Tab 3     No current facility-administered medications for this visit.     He  has a past medical history of Heart attack (HCC) (2019).    ROS   Review of Systems   Constitutional: Negative for chills and fever.   Gastrointestinal: Negative for abdominal pain, blood in stool, constipation, diarrhea, melena and vomiting.   Genitourinary: Negative for dysuria and urgency.   Psychiatric/Behavioral: Negative for depression. The patient is not nervous/anxious.        "Objective:     Physical Exam:  /80   Pulse (!) 48   Temp 37.1 °C (98.7 °F) (Temporal)   Ht 1.87 m (6' 1.62\")   Wt 105 kg (232 lb)   SpO2 98%  Body mass index is 30.09 kg/m².   Constitutional: Alert, no distress.  Skin: Warm, dry, good turgor, no rashes in visible areas.  Eye: Equal, round and reactive, conjunctiva clear, lids normal.  Respiratory: Unlabored respiratory effort,   : Large 10 mm x 10 mm external hemorrhoid with signs of recent bleeding seen at the 3 o'clock position adjacent to rectal sphyncter  Psych: Alert and oriented x3, normal affect and mood.    Assessment and Plan:     1. Hemorrhoids, unspecified hemorrhoid type  -At this time we will treat with hydrocortisone cream.  Patient is to use this twice a day.  Also discussed other conservative treatment measures such as warm sitz bath, warm, moist heat.  Also discussed the importance of avoiding constipation, avoiding bearing down while having bowel movement.  Also discussed avoiding Xena for extended period of time.  Patient will follow-up if symptoms do not improve.  While there does appear to be issue now is mostly due to external hemorrhoids.  If there is no improvement then we may need to discuss referral to surgeon for hemorrhoidectomy.  Patient understood and agree with current treatment plan.  - hydrocortisone (ANUSOL-HC) 25 MG Suppos; Insert 1 Suppository into the rectum every 12 hours for 12 days.  Dispense: 24 Suppository; Refill: 0      Followup: Return if symptoms worsen or fail to improve.         PLEASE NOTE: This dictation was created using voice recognition software. I have made every reasonable attempt to correct obvious errors, but I expect that there are errors of grammar and possibly content that I did not discover before finalizing the note.  "

## 2021-07-27 ENCOUNTER — HOSPITAL ENCOUNTER (OUTPATIENT)
Dept: LAB | Facility: MEDICAL CENTER | Age: 66
End: 2021-07-27
Attending: FAMILY MEDICINE
Payer: COMMERCIAL

## 2021-07-27 DIAGNOSIS — R73.09 ELEVATED RANDOM BLOOD GLUCOSE LEVEL: ICD-10-CM

## 2021-07-27 LAB
EST. AVERAGE GLUCOSE BLD GHB EST-MCNC: 111 MG/DL
HBA1C MFR BLD: 5.5 % (ref 4–5.6)

## 2021-07-27 PROCEDURE — 36415 COLL VENOUS BLD VENIPUNCTURE: CPT

## 2021-07-27 PROCEDURE — 83036 HEMOGLOBIN GLYCOSYLATED A1C: CPT

## 2021-07-28 ENCOUNTER — OFFICE VISIT (OUTPATIENT)
Dept: CARDIOLOGY | Facility: MEDICAL CENTER | Age: 66
End: 2021-07-28
Attending: FAMILY MEDICINE
Payer: COMMERCIAL

## 2021-07-28 VITALS
HEIGHT: 74 IN | OXYGEN SATURATION: 98 % | DIASTOLIC BLOOD PRESSURE: 80 MMHG | BODY MASS INDEX: 30.03 KG/M2 | RESPIRATION RATE: 14 BRPM | HEART RATE: 52 BPM | WEIGHT: 234 LBS | SYSTOLIC BLOOD PRESSURE: 122 MMHG

## 2021-07-28 DIAGNOSIS — I10 ESSENTIAL HYPERTENSION: ICD-10-CM

## 2021-07-28 DIAGNOSIS — I25.10 CORONARY ARTERY DISEASE INVOLVING NATIVE CORONARY ARTERY OF NATIVE HEART WITHOUT ANGINA PECTORIS: ICD-10-CM

## 2021-07-28 DIAGNOSIS — R06.09 DOE (DYSPNEA ON EXERTION): ICD-10-CM

## 2021-07-28 DIAGNOSIS — I49.3 PVC (PREMATURE VENTRICULAR CONTRACTION): ICD-10-CM

## 2021-07-28 DIAGNOSIS — E78.2 MIXED HYPERLIPIDEMIA: ICD-10-CM

## 2021-07-28 PROCEDURE — 93000 ELECTROCARDIOGRAM COMPLETE: CPT | Performed by: INTERNAL MEDICINE

## 2021-07-28 PROCEDURE — 99204 OFFICE O/P NEW MOD 45 MIN: CPT | Performed by: INTERNAL MEDICINE

## 2021-07-28 ASSESSMENT — FIBROSIS 4 INDEX: FIB4 SCORE: 1.46

## 2021-07-28 NOTE — PROGRESS NOTES
Chief Complaint   Patient presents with   • Hypertension     F/V Dx: Essential hypertension   • Hyperlipidemia     F/V Dx: Mixed hyperlipidemia   • Coronary Artery Disease     F/V Dx: Coronary artery disease involving native coronary artery of native heart       Subjective:   Jean Winter is a 66 y.o. male who presents today for initial consultation regarding CAD characterized by non-STEMI January 2019 status post primary PCI and apparently 1 stent with moderate nonobstructive CAD otherwise per his report.  Records were not available this was done at [facility].  Takes his medication as directed has asymptomatic PVCs and notes intermittent dyspnea on exertion but is able to exert himself actively on a regular basis.  Recently retired and moved to the area from the [facility].  Takes his medication as directed.    Past Medical History:   Diagnosis Date   • Heart attack (HCC) 2019     History reviewed. No pertinent surgical history.  Family History   Problem Relation Age of Onset   • Depression Mother    • Cancer Father         Prostate cancer   • No Known Problems Sister    • No Known Problems Brother    • No Known Problems Sister      Social History     Socioeconomic History   • Marital status:      Spouse name: Not on file   • Number of children: Not on file   • Years of education: Not on file   • Highest education level: Not on file   Occupational History   • Not on file   Tobacco Use   • Smoking status: Never Smoker   • Smokeless tobacco: Never Used   Vaping Use   • Vaping Use: Never used   Substance and Sexual Activity   • Alcohol use: Yes     Alcohol/week: 3.6 oz     Types: 6 Standard drinks or equivalent per week   • Drug use: Not Currently   • Sexual activity: Not on file   Other Topics Concern   • Not on file   Social History Narrative   • Not on file     Social Determinants of Health     Financial Resource Strain:    • Difficulty of Paying Living Expenses:    Food Insecurity:    • Worried About Running  "Out of Food in the Last Year:    • Ran Out of Food in the Last Year:    Transportation Needs:    • Lack of Transportation (Medical):    • Lack of Transportation (Non-Medical):    Physical Activity:    • Days of Exercise per Week:    • Minutes of Exercise per Session:    Stress:    • Feeling of Stress :    Social Connections:    • Frequency of Communication with Friends and Family:    • Frequency of Social Gatherings with Friends and Family:    • Attends Faith Services:    • Active Member of Clubs or Organizations:    • Attends Club or Organization Meetings:    • Marital Status:    Intimate Partner Violence:    • Fear of Current or Ex-Partner:    • Emotionally Abused:    • Physically Abused:    • Sexually Abused:      No Known Allergies  Outpatient Encounter Medications as of 7/28/2021   Medication Sig Dispense Refill   • atorvastatin (LIPITOR) 40 MG Tab Take 1 tablet by mouth every day. 90 tablet 3   • lisinopril (PRINIVIL) 5 MG Tab Take 1 tablet by mouth every day. 90 tablet 3   • aspirin 81 MG EC tablet Take 1 Tab by mouth every day. 90 Tab 3   • hydrocortisone rectal (PROCTOSOL HC) 2.5% Cream Apply 1-2 grams rectally twice a day for 14 days as needed for hemorrhoids (Patient not taking: Reported on 7/28/2021) 28 g 1   • triamcinolone acetonide (KENALOG) 0.1 % Cream Apply to the affected area on leg twice a day for 1-2 weeks. (Patient not taking: Reported on 7/28/2021) 45 g 3     No facility-administered encounter medications on file as of 7/28/2021.     Review of Systems   All other systems reviewed and are negative.       Objective:   /80 (BP Location: Left arm, Patient Position: Sitting, BP Cuff Size: Adult)   Pulse (!) 52   Resp 14   Ht 1.87 m (6' 1.62\")   Wt 106 kg (234 lb)   SpO2 98%   BMI 30.35 kg/m²     Physical Exam   Constitutional: He is oriented to person, place, and time. He appears well-developed. No distress.   HENT:   Head: Normocephalic and atraumatic.   Right Ear: External ear " normal.   Left Ear: External ear normal.   Eyes: Pupils are equal, round, and reactive to light. Conjunctivae are normal. Right eye exhibits no discharge. Left eye exhibits no discharge. No scleral icterus.   Neck: No JVD present. No tracheal deviation present. No thyromegaly present.   Cardiovascular: Normal rate and regular rhythm. PMI is not displaced. Exam reveals no gallop and no friction rub.   No murmur heard.  Pulses:       Carotid pulses are 2+ on the right side and 2+ on the left side.       Radial pulses are 2+ on the left side.        Popliteal pulses are 2+ on the right side and 2+ on the left side.        Dorsalis pedis pulses are 2+ on the right side and 2+ on the left side.        Posterior tibial pulses are 2+ on the right side and 2+ on the left side.   Pulmonary/Chest: Effort normal and breath sounds normal. No respiratory distress. He has no wheezes. He has no rales. He exhibits no tenderness.   Abdominal: Soft. Bowel sounds are normal. He exhibits no distension. There is no abdominal tenderness.   Musculoskeletal:         General: No tenderness or deformity. Normal range of motion.      Cervical back: Normal range of motion and neck supple.   Neurological: He is alert and oriented to person, place, and time. No cranial nerve deficit (cranial nerves II through XII grossly intact). Coordination normal.   Skin: Skin is warm and dry. No rash noted. He is not diaphoretic. No erythema. No pallor.   Psychiatric: His behavior is normal. Thought content normal.   Vitals reviewed.    LABS:  Lab Results   Component Value Date/Time    CHOLSTRLTOT 111 10/31/2020 07:33 AM    LDL 56 10/31/2020 07:33 AM    HDL 45 10/31/2020 07:33 AM    TRIGLYCERIDE 52 10/31/2020 07:33 AM       Lab Results   Component Value Date/Time    WBC 5.8 10/31/2020 07:33 AM    RBC 4.69 (L) 10/31/2020 07:33 AM    HEMOGLOBIN 16.5 10/31/2020 07:33 AM    HEMATOCRIT 47.9 10/31/2020 07:33 AM    .1 (H) 10/31/2020 07:33 AM     Lab Results    Component Value Date/Time    SODIUM 138 10/31/2020 07:33 AM    POTASSIUM 4.7 10/31/2020 07:33 AM    CHLORIDE 101 10/31/2020 07:33 AM    CO2 27 10/31/2020 07:33 AM    GLUCOSE 108 (H) 10/31/2020 07:33 AM    BUN 18 10/31/2020 07:33 AM    CREATININE 0.96 10/31/2020 07:33 AM     Lab Results   Component Value Date    HBA1C 5.5 07/27/2021      Lab Results   Component Value Date/Time    ALKPHOSPHAT 52 10/31/2020 07:33 AM    ASTSGOT 31 10/31/2020 07:33 AM    ALTSGPT 47 10/31/2020 07:33 AM    TBILIRUBIN 1.6 (H) 10/31/2020 07:33 AM      No results found for: BNPBTYPENAT   No results found for: TSH  No results found for: PROTHROMBTM, INR         Assessment:     1. Essential hypertension  EKG    EC-ECHOCARDIOGRAM COMPLETE W/O CONT    NM-CARDIAC STRESS TEST   2. Coronary artery disease involving native coronary artery of native heart without angina pectoris  EKG    EC-ECHOCARDIOGRAM COMPLETE W/O CONT    NM-CARDIAC STRESS TEST   3. Mixed hyperlipidemia     4. RUIZ (dyspnea on exertion)  EC-ECHOCARDIOGRAM COMPLETE W/O CONT    NM-CARDIAC STRESS TEST   5. PVC (premature ventricular contraction)         Medical Decision Making:  Today's Assessment / Status / Plan:     Doing well takes medications as directed.  Did not tolerate beta-blockers due to bradycardia.  PVCs are asymptomatic.  He is on aware of his post infarction LVEF and I recommend reevaluating this.  He has intermittent dyspnea on exertion which may be related to the PVCs or a noncardiac issue and I recommend stress testing given his history of moderate nonobstructive disease several years ago.  We discussed the options and have agreed to proceed with the above testing.  Recommend he follow-up after and proceed to have continue to have a goal LDL less than 70.

## 2021-07-31 LAB — EKG IMPRESSION: NORMAL

## 2021-09-14 ENCOUNTER — OFFICE VISIT (OUTPATIENT)
Dept: URGENT CARE | Facility: CLINIC | Age: 66
End: 2021-09-14
Payer: COMMERCIAL

## 2021-09-14 ENCOUNTER — HOSPITAL ENCOUNTER (OUTPATIENT)
Facility: MEDICAL CENTER | Age: 66
End: 2021-09-14
Attending: PHYSICIAN ASSISTANT
Payer: COMMERCIAL

## 2021-09-14 VITALS
RESPIRATION RATE: 20 BRPM | HEIGHT: 73 IN | WEIGHT: 234.2 LBS | OXYGEN SATURATION: 96 % | TEMPERATURE: 96.8 F | SYSTOLIC BLOOD PRESSURE: 122 MMHG | BODY MASS INDEX: 31.04 KG/M2 | HEART RATE: 60 BPM | DIASTOLIC BLOOD PRESSURE: 82 MMHG

## 2021-09-14 DIAGNOSIS — R05.9 COUGH: Primary | ICD-10-CM

## 2021-09-14 DIAGNOSIS — R19.7 DIARRHEA, UNSPECIFIED TYPE: ICD-10-CM

## 2021-09-14 DIAGNOSIS — R51.9 ACUTE NONINTRACTABLE HEADACHE, UNSPECIFIED HEADACHE TYPE: ICD-10-CM

## 2021-09-14 DIAGNOSIS — R09.81 NASAL CONGESTION: ICD-10-CM

## 2021-09-14 DIAGNOSIS — Z20.822 EXPOSURE TO CONFIRMED CASE OF COVID-19: ICD-10-CM

## 2021-09-14 PROCEDURE — U0005 INFEC AGEN DETEC AMPLI PROBE: HCPCS

## 2021-09-14 PROCEDURE — U0003 INFECTIOUS AGENT DETECTION BY NUCLEIC ACID (DNA OR RNA); SEVERE ACUTE RESPIRATORY SYNDROME CORONAVIRUS 2 (SARS-COV-2) (CORONAVIRUS DISEASE [COVID-19]), AMPLIFIED PROBE TECHNIQUE, MAKING USE OF HIGH THROUGHPUT TECHNOLOGIES AS DESCRIBED BY CMS-2020-01-R: HCPCS

## 2021-09-14 PROCEDURE — 99213 OFFICE O/P EST LOW 20 MIN: CPT | Mod: CS | Performed by: PHYSICIAN ASSISTANT

## 2021-09-14 ASSESSMENT — ENCOUNTER SYMPTOMS
DIARRHEA: 1
COUGH: 1
NAUSEA: 0
SPUTUM PRODUCTION: 1
FEVER: 0
SORE THROAT: 0
CHILLS: 1
EYE DISCHARGE: 0
VOMITING: 0
SHORTNESS OF BREATH: 0
DIZZINESS: 0
MUSCULOSKELETAL NEGATIVE: 1
HEADACHES: 1

## 2021-09-14 ASSESSMENT — FIBROSIS 4 INDEX: FIB4 SCORE: 1.46

## 2021-09-15 NOTE — PROGRESS NOTES
Subjective     Jean Winter is a 66 y.o. male who presents with Coronavirus Screening (cough, cold sweats, congestion, headache x 8 days, exposed, vaccinated)    Medications:    • aspirin  • atorvastatin Tabs  • lisinopril Tabs    Allergies: Patient has no known allergies.    Problem List: Aldair Winter does not have any pertinent problems on file.    Surgical History:  No past surgical history on file.    Past Social Hx: Aldair Winter  reports that he has never smoked. He has never used smokeless tobacco. He reports current alcohol use of about 3.6 oz of alcohol per week. He reports previous drug use.     Past Family Hx:  Aldair Winter family history includes Cancer in his father; Depression in his mother; No Known Problems in his brother, sister, and sister.     Problem list, medications, and allergies reviewed by myself today in Epic.            Patient presents with:  Coronavirus Screening: cough, cold sweats, congestion, headache x 8 days, exposed to covid positive person in home.  Pt has been vaccinated against COVID-19.  Pt has been taking otc cough/cold medicine with some relief.       URI   This is a new problem. The current episode started in the past 7 days. The problem has been unchanged. There has been no fever. Associated symptoms include congestion, coughing, diarrhea and headaches. Pertinent negatives include no chest pain, nausea, rash, sore throat or vomiting. He has tried acetaminophen, decongestant, increased fluids and sleep for the symptoms. The treatment provided mild relief.       Review of Systems   Constitutional: Positive for chills. Negative for fever and malaise/fatigue.   HENT: Positive for congestion. Negative for sore throat.    Eyes: Negative for discharge.   Respiratory: Positive for cough and sputum production. Negative for shortness of breath.    Cardiovascular: Negative for chest pain.   Gastrointestinal: Positive for diarrhea. Negative for nausea and vomiting.  "  Musculoskeletal: Negative.    Skin: Negative for rash.   Neurological: Positive for headaches. Negative for dizziness.   All other systems reviewed and are negative.           Objective     /82   Pulse 60   Temp 36 °C (96.8 °F) (Temporal)   Resp 20   Ht 1.859 m (6' 1.2\")   Wt 106 kg (234 lb 3.2 oz)   SpO2 96%   BMI 30.73 kg/m²      Physical Exam  Vitals and nursing note reviewed.   Constitutional:       General: He is not in acute distress.     Appearance: Normal appearance. He is well-developed and normal weight. He is not ill-appearing or toxic-appearing.   HENT:      Head: Normocephalic and atraumatic.      Right Ear: Tympanic membrane normal.      Left Ear: Tympanic membrane normal.      Nose: Mucosal edema, congestion and rhinorrhea present.      Mouth/Throat:      Lips: Pink.      Mouth: Mucous membranes are moist.      Pharynx: Uvula midline. Posterior oropharyngeal erythema present. No uvula swelling.   Eyes:      Extraocular Movements: Extraocular movements intact.      Conjunctiva/sclera: Conjunctivae normal.      Pupils: Pupils are equal, round, and reactive to light.   Cardiovascular:      Rate and Rhythm: Normal rate and regular rhythm.      Pulses: Normal pulses.      Heart sounds: Normal heart sounds.   Pulmonary:      Effort: Pulmonary effort is normal. No respiratory distress.      Breath sounds: Normal breath sounds. No stridor. No decreased breath sounds, wheezing or rhonchi.   Abdominal:      Palpations: Abdomen is soft.   Musculoskeletal:         General: Normal range of motion.      Cervical back: Normal range of motion and neck supple.   Lymphadenopathy:      Cervical: No cervical adenopathy.   Skin:     General: Skin is warm and dry.      Capillary Refill: Capillary refill takes less than 2 seconds.   Neurological:      General: No focal deficit present.      Mental Status: He is alert and oriented to person, place, and time.      Gait: Gait normal.   Psychiatric:         Mood " and Affect: Mood normal.         Behavior: Behavior is cooperative.                     Assessment & Plan              1. Cough  COVID/SARS CoV-2 PCR   2. Nasal congestion  COVID/SARS CoV-2 PCR   3. Diarrhea, unspecified type  COVID/SARS CoV-2 PCR   4. Acute nonintractable headache, unspecified headache type  COVID/SARS CoV-2 PCR   5. Exposure to confirmed case of COVID-19  COVID/SARS CoV-2 PCR     Per protocol for PUI/ISO patients, the patient was evaluated by me while I was wearing PPE.  Per CDC guidelines, patient has been instructed to self quarantine at home until test results are known.  IF positive, pt to remain at home for 10 days from onset of symptoms.  If negative, pt to remain at home until fever has resolved.       PT can begin or continue OTC medications, increase fluids and rest until symptoms improve.     PT should follow up with PCP in 1-2 days for re-evaluation if symptoms have not improved.      Discussed red flags and reasons to return to UC or ED.      Pt and/or family verbalized understanding of diagnosis and follow up instructions and was offered informational handout on diagnosis.  PT discharged.

## 2021-09-16 ENCOUNTER — TELEMEDICINE (OUTPATIENT)
Dept: MEDICAL GROUP | Facility: LAB | Age: 66
End: 2021-09-16
Payer: COMMERCIAL

## 2021-09-16 VITALS — BODY MASS INDEX: 31.01 KG/M2 | WEIGHT: 234 LBS | HEIGHT: 73 IN

## 2021-09-16 DIAGNOSIS — R09.81 NASAL CONGESTION: ICD-10-CM

## 2021-09-16 DIAGNOSIS — Z20.822 EXPOSURE TO CONFIRMED CASE OF COVID-19: ICD-10-CM

## 2021-09-16 DIAGNOSIS — R19.7 DIARRHEA, UNSPECIFIED TYPE: ICD-10-CM

## 2021-09-16 DIAGNOSIS — R51.9 ACUTE NONINTRACTABLE HEADACHE, UNSPECIFIED HEADACHE TYPE: ICD-10-CM

## 2021-09-16 DIAGNOSIS — Z91.199 NO-SHOW FOR APPOINTMENT: ICD-10-CM

## 2021-09-16 DIAGNOSIS — R05.9 COUGH: ICD-10-CM

## 2021-09-16 LAB
COVID ORDER STATUS COVID19: NORMAL
SARS-COV-2 RNA RESP QL NAA+PROBE: DETECTED
SPECIMEN SOURCE: ABNORMAL

## 2021-09-16 ASSESSMENT — FIBROSIS 4 INDEX: FIB4 SCORE: 1.46

## 2021-10-19 ENCOUNTER — HOSPITAL ENCOUNTER (OUTPATIENT)
Dept: RADIOLOGY | Facility: MEDICAL CENTER | Age: 66
End: 2021-10-19
Attending: INTERNAL MEDICINE
Payer: COMMERCIAL

## 2021-10-19 ENCOUNTER — HOSPITAL ENCOUNTER (OUTPATIENT)
Dept: CARDIOLOGY | Facility: MEDICAL CENTER | Age: 66
End: 2021-10-19
Attending: INTERNAL MEDICINE
Payer: COMMERCIAL

## 2021-10-19 DIAGNOSIS — I25.10 CORONARY ARTERY DISEASE INVOLVING NATIVE CORONARY ARTERY OF NATIVE HEART WITHOUT ANGINA PECTORIS: ICD-10-CM

## 2021-10-19 DIAGNOSIS — R06.09 DOE (DYSPNEA ON EXERTION): ICD-10-CM

## 2021-10-19 DIAGNOSIS — I10 ESSENTIAL HYPERTENSION: ICD-10-CM

## 2021-10-19 PROCEDURE — 78452 HT MUSCLE IMAGE SPECT MULT: CPT | Mod: 26 | Performed by: INTERNAL MEDICINE

## 2021-10-19 PROCEDURE — A9502 TC99M TETROFOSMIN: HCPCS

## 2021-10-19 PROCEDURE — 93306 TTE W/DOPPLER COMPLETE: CPT

## 2021-10-19 PROCEDURE — 93018 CV STRESS TEST I&R ONLY: CPT | Performed by: INTERNAL MEDICINE

## 2021-10-20 LAB
LV EJECT FRACT  99904: 65
LV EJECT FRACT MOD 2C 99903: 55.46
LV EJECT FRACT MOD 4C 99902: 63.1
LV EJECT FRACT MOD BP 99901: 60.16

## 2021-10-20 PROCEDURE — 93306 TTE W/DOPPLER COMPLETE: CPT | Mod: 26 | Performed by: INTERNAL MEDICINE

## 2021-11-10 ENCOUNTER — OFFICE VISIT (OUTPATIENT)
Dept: CARDIOLOGY | Facility: MEDICAL CENTER | Age: 66
End: 2021-11-10
Payer: COMMERCIAL

## 2021-11-10 VITALS
OXYGEN SATURATION: 99 % | WEIGHT: 240 LBS | DIASTOLIC BLOOD PRESSURE: 80 MMHG | HEIGHT: 73 IN | HEART RATE: 48 BPM | SYSTOLIC BLOOD PRESSURE: 128 MMHG | BODY MASS INDEX: 31.81 KG/M2

## 2021-11-10 DIAGNOSIS — I25.10 CORONARY ARTERY DISEASE INVOLVING NATIVE CORONARY ARTERY OF NATIVE HEART WITHOUT ANGINA PECTORIS: ICD-10-CM

## 2021-11-10 DIAGNOSIS — I10 ESSENTIAL HYPERTENSION: ICD-10-CM

## 2021-11-10 DIAGNOSIS — E78.2 MIXED HYPERLIPIDEMIA: ICD-10-CM

## 2021-11-10 PROCEDURE — 99213 OFFICE O/P EST LOW 20 MIN: CPT | Performed by: INTERNAL MEDICINE

## 2021-11-10 ASSESSMENT — FIBROSIS 4 INDEX: FIB4 SCORE: 1.46

## 2021-11-10 ASSESSMENT — ENCOUNTER SYMPTOMS: ABDOMINAL PAIN: 1

## 2021-11-10 NOTE — PROGRESS NOTES
Chief Complaint   Patient presents with   • HTN (Controlled)     follow up       Subjective:   Jean Winter is a 66 y.o. male who presents today for initial follow-up regarding CAD characterized by non-STEMI January 2019 status post primary PCI and apparently 1 stent with moderate nonobstructive CAD otherwise per his report.  Records were not available this was done at [place].  Takes his medication as directed has asymptomatic PVCs and notes intermittent dyspnea on exertion but is able to exert himself actively on a regular basis.  Recently retired and moved to the area from the [facility].  Takes his medication as directed.    In the interim doing well.  Walking.  No symptoms.    Past Medical History:   Diagnosis Date   • Heart attack (HCC) 2019     History reviewed. No pertinent surgical history.  Family History   Problem Relation Age of Onset   • Depression Mother    • Cancer Father         Prostate cancer   • No Known Problems Sister    • No Known Problems Brother    • No Known Problems Sister      Social History     Socioeconomic History   • Marital status:      Spouse name: Not on file   • Number of children: Not on file   • Years of education: Not on file   • Highest education level: Not on file   Occupational History   • Not on file   Tobacco Use   • Smoking status: Never Smoker   • Smokeless tobacco: Never Used   Vaping Use   • Vaping Use: Never used   Substance and Sexual Activity   • Alcohol use: Yes     Alcohol/week: 3.6 oz     Types: 6 Standard drinks or equivalent per week   • Drug use: Not Currently   • Sexual activity: Not on file   Other Topics Concern   • Not on file   Social History Narrative   • Not on file     Social Determinants of Health     Financial Resource Strain:    • Difficulty of Paying Living Expenses: Not on file   Food Insecurity:    • Worried About Running Out of Food in the Last Year: Not on file   • Ran Out of Food in the Last Year: Not on file   Transportation Needs:   "  • Lack of Transportation (Medical): Not on file   • Lack of Transportation (Non-Medical): Not on file   Physical Activity:    • Days of Exercise per Week: Not on file   • Minutes of Exercise per Session: Not on file   Stress:    • Feeling of Stress : Not on file   Social Connections:    • Frequency of Communication with Friends and Family: Not on file   • Frequency of Social Gatherings with Friends and Family: Not on file   • Attends Jewish Services: Not on file   • Active Member of Clubs or Organizations: Not on file   • Attends Club or Organization Meetings: Not on file   • Marital Status: Not on file   Intimate Partner Violence:    • Fear of Current or Ex-Partner: Not on file   • Emotionally Abused: Not on file   • Physically Abused: Not on file   • Sexually Abused: Not on file   Housing Stability:    • Unable to Pay for Housing in the Last Year: Not on file   • Number of Places Lived in the Last Year: Not on file   • Unstable Housing in the Last Year: Not on file     No Known Allergies  Outpatient Encounter Medications as of 11/10/2021   Medication Sig Dispense Refill   • atorvastatin (LIPITOR) 40 MG Tab Take 1 tablet by mouth every day. 90 tablet 3   • lisinopril (PRINIVIL) 5 MG Tab Take 1 tablet by mouth every day. 90 tablet 3   • aspirin 81 MG EC tablet Take 1 Tab by mouth every day. 90 Tab 3     No facility-administered encounter medications on file as of 11/10/2021.     Review of Systems   Gastrointestinal: Positive for abdominal pain.   All other systems reviewed and are negative.       Objective:   /80 (BP Location: Left arm, Patient Position: Sitting)   Pulse (!) 48   Ht 1.854 m (6' 1\")   Wt 109 kg (240 lb)   SpO2 99%   BMI 31.66 kg/m²     Physical Exam  Vitals reviewed.   Constitutional:       General: He is not in acute distress.     Appearance: He is well-developed. He is not diaphoretic.   HENT:      Head: Normocephalic and atraumatic.      Right Ear: External ear normal.      Left " Ear: External ear normal.   Eyes:      General: No scleral icterus.        Right eye: No discharge.         Left eye: No discharge.      Conjunctiva/sclera: Conjunctivae normal.      Pupils: Pupils are equal, round, and reactive to light.   Neck:      Thyroid: No thyromegaly.      Vascular: No JVD.      Trachea: No tracheal deviation.   Cardiovascular:      Rate and Rhythm: Normal rate and regular rhythm.      Chest Wall: PMI is not displaced.      Pulses:           Carotid pulses are 2+ on the right side and 2+ on the left side.       Radial pulses are 2+ on the left side.        Popliteal pulses are 2+ on the right side and 2+ on the left side.        Dorsalis pedis pulses are 2+ on the right side and 2+ on the left side.        Posterior tibial pulses are 2+ on the right side and 2+ on the left side.      Heart sounds: No murmur heard.  No friction rub. No gallop.    Pulmonary:      Effort: Pulmonary effort is normal. No respiratory distress.      Breath sounds: Normal breath sounds. No wheezing or rales.   Chest:      Chest wall: No tenderness.   Abdominal:      General: Bowel sounds are normal. There is no distension.      Palpations: Abdomen is soft.      Tenderness: There is no abdominal tenderness.   Musculoskeletal:         General: No tenderness or deformity. Normal range of motion.      Cervical back: Normal range of motion and neck supple.   Skin:     General: Skin is warm and dry.      Coloration: Skin is not pale.      Findings: No erythema or rash.   Neurological:      Mental Status: He is alert and oriented to person, place, and time.      Cranial Nerves: No cranial nerve deficit (cranial nerves II through XII grossly intact).      Coordination: Coordination normal.   Psychiatric:         Behavior: Behavior normal.         Thought Content: Thought content normal.       LABS:  Lab Results   Component Value Date/Time    CHOLSTRLTOT 111 10/31/2020 07:33 AM    LDL 56 10/31/2020 07:33 AM    HDL 45  10/31/2020 07:33 AM    TRIGLYCERIDE 52 10/31/2020 07:33 AM       Lab Results   Component Value Date/Time    WBC 5.8 10/31/2020 07:33 AM    RBC 4.69 (L) 10/31/2020 07:33 AM    HEMOGLOBIN 16.5 10/31/2020 07:33 AM    HEMATOCRIT 47.9 10/31/2020 07:33 AM    .1 (H) 10/31/2020 07:33 AM     Lab Results   Component Value Date/Time    SODIUM 138 10/31/2020 07:33 AM    POTASSIUM 4.7 10/31/2020 07:33 AM    CHLORIDE 101 10/31/2020 07:33 AM    CO2 27 10/31/2020 07:33 AM    GLUCOSE 108 (H) 10/31/2020 07:33 AM    BUN 18 10/31/2020 07:33 AM    CREATININE 0.96 10/31/2020 07:33 AM     Lab Results   Component Value Date    HBA1C 5.5 07/27/2021      Lab Results   Component Value Date/Time    ALKPHOSPHAT 52 10/31/2020 07:33 AM    ASTSGOT 31 10/31/2020 07:33 AM    ALTSGPT 47 10/31/2020 07:33 AM    TBILIRUBIN 1.6 (H) 10/31/2020 07:33 AM      No results found for: BNPBTYPENAT   No results found for: TSH  No results found for: PROTHROMBTM, INR     ECHO CONCLUSIONS (10/19/2021):  No prior study is available for comparison.   The left ventricle is normal in size and thickness.  The left ventricular ejection fraction is visually estimated to be 65%.  Mild mitral regurgitation.  Normal inferior vena cava size and inspiratory collapse.    STRESS (10/19/2021):  No ischemia identified.   Exercise capacity very good at >10 METS.   PVCs in recovery.      Assessment:     1. Essential hypertension     2. Mixed hyperlipidemia     3. Coronary artery disease involving native coronary artery of native heart without angina pectoris         Medical Decision Making:  Today's Assessment / Status / Plan:     Doing well takes medications as directed.  Did not tolerate beta-blockers due to bradycardia.  PVCs are asymptomatic.  EF is normal.  Stress test is unremarkable.  Continue current medical therapy.

## 2021-11-15 ENCOUNTER — PATIENT MESSAGE (OUTPATIENT)
Dept: MEDICAL GROUP | Facility: LAB | Age: 66
End: 2021-11-15

## 2021-11-15 DIAGNOSIS — R73.09 ELEVATED RANDOM BLOOD GLUCOSE LEVEL: ICD-10-CM

## 2021-11-15 DIAGNOSIS — E78.2 MIXED HYPERLIPIDEMIA: ICD-10-CM

## 2021-11-15 NOTE — TELEPHONE ENCOUNTER
We will need to recheck globin C in January, 6 months from the last normal hemoglobin A1c.  At that time we will also check lipid profile.  I have placed those labs.  Please make sure he gets this completed in January.  He needs to be fasting 8 to 10 hours before labs are drawn.  Thank you.

## 2022-01-21 ENCOUNTER — HOSPITAL ENCOUNTER (OUTPATIENT)
Dept: LAB | Facility: MEDICAL CENTER | Age: 67
End: 2022-01-21
Attending: FAMILY MEDICINE
Payer: COMMERCIAL

## 2022-01-21 DIAGNOSIS — R73.09 ELEVATED RANDOM BLOOD GLUCOSE LEVEL: ICD-10-CM

## 2022-01-21 DIAGNOSIS — E78.2 MIXED HYPERLIPIDEMIA: ICD-10-CM

## 2022-01-21 LAB
CHOLEST SERPL-MCNC: 112 MG/DL (ref 100–199)
EST. AVERAGE GLUCOSE BLD GHB EST-MCNC: 108 MG/DL
FASTING STATUS PATIENT QL REPORTED: NORMAL
HBA1C MFR BLD: 5.4 % (ref 4–5.6)
HDLC SERPL-MCNC: 40 MG/DL
LDLC SERPL CALC-MCNC: 56 MG/DL
TRIGL SERPL-MCNC: 81 MG/DL (ref 0–149)

## 2022-01-21 PROCEDURE — 80061 LIPID PANEL: CPT

## 2022-01-21 PROCEDURE — 83036 HEMOGLOBIN GLYCOSYLATED A1C: CPT

## 2022-01-21 PROCEDURE — 36415 COLL VENOUS BLD VENIPUNCTURE: CPT

## 2022-02-23 ENCOUNTER — PATIENT MESSAGE (OUTPATIENT)
Dept: MEDICAL GROUP | Facility: LAB | Age: 67
End: 2022-02-23

## 2022-02-23 DIAGNOSIS — R21 SKIN RASH: ICD-10-CM

## 2022-04-27 RX ORDER — LISINOPRIL 5 MG/1
TABLET ORAL
Qty: 90 TABLET | Refills: 3 | Status: SHIPPED | OUTPATIENT
Start: 2022-04-27 | End: 2022-11-02

## 2022-04-27 RX ORDER — ATORVASTATIN CALCIUM 40 MG/1
TABLET, FILM COATED ORAL
Qty: 90 TABLET | Refills: 3 | Status: SHIPPED | OUTPATIENT
Start: 2022-04-27 | End: 2023-01-23 | Stop reason: SDUPTHER

## 2022-04-27 NOTE — TELEPHONE ENCOUNTER
Received request via: Pharmacy    Was the patient seen in the last year in this department? Yes  9/15/21  Does the patient have an active prescription (recently filled or refills available) for medication(s) requested? No

## 2022-11-02 ENCOUNTER — OFFICE VISIT (OUTPATIENT)
Dept: MEDICAL GROUP | Facility: LAB | Age: 67
End: 2022-11-02
Payer: MEDICARE

## 2022-11-02 VITALS
HEIGHT: 74 IN | SYSTOLIC BLOOD PRESSURE: 130 MMHG | OXYGEN SATURATION: 96 % | TEMPERATURE: 98 F | DIASTOLIC BLOOD PRESSURE: 74 MMHG | HEART RATE: 54 BPM | RESPIRATION RATE: 14 BRPM | WEIGHT: 235 LBS | BODY MASS INDEX: 30.16 KG/M2

## 2022-11-02 DIAGNOSIS — I25.10 CORONARY ARTERY DISEASE INVOLVING NATIVE CORONARY ARTERY OF NATIVE HEART WITHOUT ANGINA PECTORIS: ICD-10-CM

## 2022-11-02 DIAGNOSIS — Z11.59 NEED FOR HEPATITIS C SCREENING TEST: ICD-10-CM

## 2022-11-02 DIAGNOSIS — Z00.00 ENCOUNTER FOR ANNUAL WELLNESS EXAM IN MEDICARE PATIENT: ICD-10-CM

## 2022-11-02 DIAGNOSIS — I10 ESSENTIAL HYPERTENSION: ICD-10-CM

## 2022-11-02 DIAGNOSIS — E78.2 MIXED HYPERLIPIDEMIA: ICD-10-CM

## 2022-11-02 DIAGNOSIS — Z12.5 ENCOUNTER FOR SCREENING FOR MALIGNANT NEOPLASM OF PROSTATE: ICD-10-CM

## 2022-11-02 PROCEDURE — G0402 INITIAL PREVENTIVE EXAM: HCPCS | Performed by: FAMILY MEDICINE

## 2022-11-02 RX ORDER — LOSARTAN POTASSIUM 25 MG/1
25 TABLET ORAL DAILY
Qty: 90 TABLET | Refills: 3 | Status: SHIPPED | OUTPATIENT
Start: 2022-11-02 | End: 2022-11-03

## 2022-11-02 ASSESSMENT — ACTIVITIES OF DAILY LIVING (ADL): BATHING_REQUIRES_ASSISTANCE: 0

## 2022-11-02 ASSESSMENT — ENCOUNTER SYMPTOMS: GENERAL WELL-BEING: EXCELLENT

## 2022-11-02 ASSESSMENT — PATIENT HEALTH QUESTIONNAIRE - PHQ9: CLINICAL INTERPRETATION OF PHQ2 SCORE: 0

## 2022-11-02 NOTE — PROGRESS NOTES
Chief Complaint   Patient presents with    Annual Exam     Wellness        HPI:  Jean is a 67 y.o. here for Medicare Annual Wellness Visit      Patient Active Problem List    Diagnosis Date Noted    Essential hypertension 10/28/2020    Mixed hyperlipidemia 10/28/2020    Coronary artery disease involving native coronary artery of native heart 10/28/2020       Current Outpatient Medications   Medication Sig Dispense Refill    atorvastatin (LIPITOR) 40 MG Tab TAKE 1 TABLET DAILY 90 Tablet 3    lisinopril (PRINIVIL) 5 MG Tab TAKE 1 TABLET DAILY 90 Tablet 3    aspirin 81 MG EC tablet Take 1 Tab by mouth every day. 90 Tab 3     No current facility-administered medications for this visit.        Patient is taking medications as noted in medication list.  Current supplements as per medication list.     Allergies: Patient has no known allergies.    Current social contact/activities: staying active, mountain biking, pickleball, golf.     Is patient current with immunizations? No, due for PNEUMOVAX (PPSV23) and SHINGRIX (Shingles). Patient is interested in receiving NONE today.    He  reports that he has never smoked. He has never used smokeless tobacco. He reports current alcohol use of about 3.6 oz per week. He reports that he does not currently use drugs.  Counseling given: Not Answered      ROS:    Gait: Uses no assistive device   Ostomy: No   Other tubes: No   Amputations: No   Chronic oxygen use No   Last eye exam  yearly   Wears hearing aids: No   : Denies any urinary leakage during the last 6 months    Screening:  Depression Screening  Little interest or pleasure in doing things?  0 - not at all  Feeling down, depressed, or hopeless? 0 - not at all  Patient Health Questionnaire Score: 0    If depressive symptoms identified deferred to follow up visit unless specifically addressed in assessment and plan.    Interpretation of PHQ-9 Total Score   Score Severity   1-4 No Depression   5-9 Mild Depression   10-14 Moderate  Depression   15-19 Moderately Severe Depression   20-27 Severe Depression    Screening for Cognitive Impairment  Three Minute Recall (daughter, heaven, mountain)  3/3    [place] clock face with all 12 numbers and set the hands to show 10 past 11.  Yes    If cognitive concerns identified, deferred for follow up unless specifically addressed in assessment and plan.    Fall Risk Assessment  Has the patient had two or more falls in the last year or any fall with injury in the last year?  No  If fall risk identified, deferred for follow up unless specifically addressed in assessment and plan.    Safety Assessment  Throw rugs on floor.  No  Handrails on all stairs.  Yes  Good lighting in all hallways.  Yes  Difficulty hearing.  No  Patient counseled about all safety risks that were identified.    Functional Assessment ADLs  Are there any barriers preventing you from cooking for yourself or meeting nutritional needs?  No.    Are there any barriers preventing you from driving safely or obtaining transportation?  No.    Are there any barriers preventing you from using a telephone or calling for help?  No.    Are there any barriers preventing you from shopping?  No.    Are there any barriers preventing you from taking care of your own finances?  No.    Are there any barriers preventing you from managing your medications?  No.    Are there any barriers preventing you from showering, bathing or dressing yourself?  No.    Are you currently engaging in any exercise or physical activity?  Yes.     What is your perception of your health?  Excellent.    Advance Care Planning  Do you have an Advance Directive, Living Will, Durable Power of , or POLST? No               Health Maintenance Summary            Overdue - IMM HEP B VACCINE (1 of 3 - 3-dose series) Overdue - never done      No completion history exists for this topic.              Overdue - HEPATITIS C SCREENING (Once) Overdue - never done      No completion history  "exists for this topic.              Overdue - IMM DTaP/Tdap/Td Vaccine (1 - Tdap) Overdue - never done      No completion history exists for this topic.              Overdue - IMM ZOSTER VACCINES (1 of 2) Overdue - never done      No completion history exists for this topic.              Overdue - IMM PNEUMOCOCCAL VACCINE: 65+ Years (1 - PCV) Overdue - never done      No completion history exists for this topic.              Overdue - COVID-19 Vaccine (4 - Booster for Pfizer series) Overdue since 8/23/2022 06/28/2022  Imm Admin: MODERNA SARS-COV-2 VACCINE (12+)    04/15/2021  Imm Admin: PFIZER PURPLE CAP SARS-COV-2 VACCINATION (12+)    03/23/2021  Imm Admin: PFIZER PURPLE CAP SARS-COV-2 VACCINATION (12+)              COLORECTAL CANCER SCREENING (COLONOSCOPY - Every 10 Years) Next due on 7/13/2031 07/13/2021  REFERRAL TO GI FOR COLONOSCOPY              IMM INFLUENZA (Series Information) Completed      10/14/2022  Imm Admin: Influenza Vaccine Adult HD              IMM MENINGOCOCCAL ACWY VACCINE (Series Information) Aged Out      No completion history exists for this topic.                    Patient Care Team:  Russell Lobo M.D. as PCP - General (Family Medicine)    Social History     Tobacco Use    Smoking status: Never    Smokeless tobacco: Never   Vaping Use    Vaping Use: Never used   Substance Use Topics    Alcohol use: Yes     Alcohol/week: 3.6 oz     Types: 6 Standard drinks or equivalent per week    Drug use: Not Currently     Family History   Problem Relation Age of Onset    Depression Mother     Cancer Father         Prostate cancer    No Known Problems Sister     No Known Problems Brother     No Known Problems Sister      He  has a past medical history of Heart attack (HCC) (2019).   No past surgical history on file.    Exam:   /74   Pulse (!) 54   Temp 36.7 °C (98 °F) (Temporal)   Resp 14   Ht 1.88 m (6' 2\")   Wt 107 kg (235 lb)   SpO2 96%  Body mass index is 30.17 " kg/m².    Hearing excellent.    Dentition good  Alert, oriented in no acute distress.  Eye contact is good, speech goal directed, affect calm      Assessment and Plan. The following treatment and monitoring plan is recommended:      1. Encounter for annual wellness exam in Medicare patient  -Reviewed all screenings, vaccinations, medications, labs.  Patient is due for new labs which we will order as below.  Discussed getting vaccines completed at pharmacy due to insurance.  Routine anticipatory guidance given.  Patient will follow-up on a yearly basis for annual wellness visit.  - Initial Annual Wellness Visit - Includes PPPS ()    2. Coronary artery disease involving native coronary artery of native heart without angina pectoris  -MI in 2017, status post stent placement.  -This problem is chronic, stable, and monitored appropriately by history/labs/imaging as needed, and is well-controlled on the current regimen.  Please continue current regimen continue with ACE/ARB, Lipitor, aspirin.  - Comp Metabolic Panel; Future  - CBC WITHOUT DIFFERENTIAL; Future    3. Essential hypertension  -This problem is chronic, stable, and monitored appropriately by history/labs/imaging as needed, and is well-controlled on the current regimen.  Please continue current regimen: We will make 1 change to her regimen given concern for possible chronic cough.  We will discontinue lisinopril, place patient on losartan.  We will check labs, continue with appropriate diet and exercise regiment.  - Comp Metabolic Panel; Future  - Lipid Profile; Future  - losartan (COZAAR) 25 MG Tab; Take 1 Tablet by mouth every day for 360 days.  Dispense: 90 Tablet; Refill: 3    4. Mixed hyperlipidemia  -This problem is chronic, stable, and monitored appropriately by history/labs/imaging as needed, and is well-controlled on the current regimen.  Please continue current regimen Lipitor 40 mg daily.  - Comp Metabolic Panel; Future  - Lipid Profile;  Future    5. Encounter for screening for malignant neoplasm of prostate  - PROSTATE SPECIFIC AG SCREENING; Future    6. Need for hepatitis C screening test  - HEP C VIRUS ANTIBODY; Future    There are no diagnoses linked to this encounter.     Services suggested: No services needed at this time  Health Care Screening recommendations as per orders if indicated.  Referrals offered: PT/OT/Nutrition counseling/Behavioral Health/Smoking cessation as per orders if indicated.    Discussion today about general wellness and lifestyle habits:    Prevent falls and reduce trip hazards; Cautioned about securing or removing rugs.  Have a working fire alarm and carbon monoxide detector;   Engage in regular physical activity and social activities     Follow-up: No follow-ups on file.

## 2022-11-04 ENCOUNTER — HOSPITAL ENCOUNTER (OUTPATIENT)
Dept: LAB | Facility: MEDICAL CENTER | Age: 67
End: 2022-11-04
Attending: FAMILY MEDICINE
Payer: MEDICARE

## 2022-11-04 DIAGNOSIS — Z11.59 NEED FOR HEPATITIS C SCREENING TEST: ICD-10-CM

## 2022-11-04 DIAGNOSIS — I10 ESSENTIAL HYPERTENSION: ICD-10-CM

## 2022-11-04 DIAGNOSIS — I25.10 CORONARY ARTERY DISEASE INVOLVING NATIVE CORONARY ARTERY OF NATIVE HEART WITHOUT ANGINA PECTORIS: ICD-10-CM

## 2022-11-04 DIAGNOSIS — E78.2 MIXED HYPERLIPIDEMIA: ICD-10-CM

## 2022-11-04 DIAGNOSIS — Z12.5 ENCOUNTER FOR SCREENING FOR MALIGNANT NEOPLASM OF PROSTATE: ICD-10-CM

## 2022-11-04 LAB
ALBUMIN SERPL BCP-MCNC: 4.8 G/DL (ref 3.2–4.9)
ALBUMIN/GLOB SERPL: 2.3 G/DL
ALP SERPL-CCNC: 46 U/L (ref 30–99)
ALT SERPL-CCNC: 32 U/L (ref 2–50)
ANION GAP SERPL CALC-SCNC: 9 MMOL/L (ref 7–16)
AST SERPL-CCNC: 24 U/L (ref 12–45)
BILIRUB SERPL-MCNC: 1.5 MG/DL (ref 0.1–1.5)
BUN SERPL-MCNC: 21 MG/DL (ref 8–22)
CALCIUM SERPL-MCNC: 9.5 MG/DL (ref 8.5–10.5)
CHLORIDE SERPL-SCNC: 103 MMOL/L (ref 96–112)
CHOLEST SERPL-MCNC: 126 MG/DL (ref 100–199)
CO2 SERPL-SCNC: 25 MMOL/L (ref 20–33)
CREAT SERPL-MCNC: 0.97 MG/DL (ref 0.5–1.4)
ERYTHROCYTE [DISTWIDTH] IN BLOOD BY AUTOMATED COUNT: 46.4 FL (ref 35.9–50)
FASTING STATUS PATIENT QL REPORTED: NORMAL
GFR SERPLBLD CREATININE-BSD FMLA CKD-EPI: 86 ML/MIN/1.73 M 2
GLOBULIN SER CALC-MCNC: 2.1 G/DL (ref 1.9–3.5)
GLUCOSE SERPL-MCNC: 101 MG/DL (ref 65–99)
HCT VFR BLD AUTO: 46.9 % (ref 42–52)
HCV AB SER QL: NORMAL
HDLC SERPL-MCNC: 41 MG/DL
HGB BLD-MCNC: 16.6 G/DL (ref 14–18)
LDLC SERPL CALC-MCNC: 71 MG/DL
MCH RBC QN AUTO: 36.2 PG (ref 27–33)
MCHC RBC AUTO-ENTMCNC: 35.4 G/DL (ref 33.7–35.3)
MCV RBC AUTO: 102.4 FL (ref 81.4–97.8)
PLATELET # BLD AUTO: 164 K/UL (ref 164–446)
PMV BLD AUTO: 9.8 FL (ref 9–12.9)
POTASSIUM SERPL-SCNC: 4.6 MMOL/L (ref 3.6–5.5)
PROT SERPL-MCNC: 6.9 G/DL (ref 6–8.2)
PSA SERPL-MCNC: 0.34 NG/ML (ref 0–4)
RBC # BLD AUTO: 4.58 M/UL (ref 4.7–6.1)
SODIUM SERPL-SCNC: 137 MMOL/L (ref 135–145)
TRIGL SERPL-MCNC: 68 MG/DL (ref 0–149)
WBC # BLD AUTO: 6 K/UL (ref 4.8–10.8)

## 2022-11-04 PROCEDURE — 86803 HEPATITIS C AB TEST: CPT

## 2022-11-04 PROCEDURE — 80053 COMPREHEN METABOLIC PANEL: CPT

## 2022-11-04 PROCEDURE — 36415 COLL VENOUS BLD VENIPUNCTURE: CPT | Mod: GA

## 2022-11-04 PROCEDURE — 80061 LIPID PANEL: CPT

## 2022-11-04 PROCEDURE — 85027 COMPLETE CBC AUTOMATED: CPT

## 2022-11-04 PROCEDURE — 84153 ASSAY OF PSA TOTAL: CPT | Mod: GA

## 2022-12-19 ENCOUNTER — TELEPHONE (OUTPATIENT)
Dept: HEALTH INFORMATION MANAGEMENT | Facility: OTHER | Age: 67
End: 2022-12-19
Payer: MEDICARE

## 2022-12-20 NOTE — TELEPHONE ENCOUNTER
Jean returned outbound call. HIPAA verified. Jean stated that he will be out of town for the Holidays and would like to schedule his QSHA after the new year. I did schedule Jean on 1/11/23 at 8:15am with Jackson Cherry at [address] for a Comprehensive Health Assessment. Jean also wanted to sign up for Mail Delivery. I did inform him that [facility] on [address] [place] [address] does mail delivery and he accepted. I did warm transfer Jean's call to Vick at the [address] [place] [address] pharmacy to assist. No other questions or concerns.

## 2023-01-11 PROBLEM — R94.30 NONSPECIFIC ABNORMAL FUNCTION STUDY, CARDIOVASCULAR: Status: ACTIVE | Noted: 2023-01-11

## 2023-01-11 PROBLEM — I77.810 MILD ASCENDING AORTA DILATION (HCC): Status: ACTIVE | Noted: 2023-01-11

## 2023-01-11 PROBLEM — E66.9 OBESITY (BMI 30.0-34.9): Status: ACTIVE | Noted: 2023-01-11

## 2023-01-11 PROBLEM — R73.01 IFG (IMPAIRED FASTING GLUCOSE): Status: ACTIVE | Noted: 2023-01-11

## 2023-01-11 PROBLEM — E66.811 OBESITY (BMI 30.0-34.9): Status: ACTIVE | Noted: 2023-01-11

## 2023-01-11 PROBLEM — R20.8 BURNING SENSATION OF FEET: Status: ACTIVE | Noted: 2023-01-11

## 2023-01-23 DIAGNOSIS — I10 ESSENTIAL HYPERTENSION: ICD-10-CM

## 2023-01-23 DIAGNOSIS — I25.10 CORONARY ARTERY DISEASE INVOLVING NATIVE CORONARY ARTERY OF NATIVE HEART WITHOUT ANGINA PECTORIS: ICD-10-CM

## 2023-01-23 PROCEDURE — RXMED WILLOW AMBULATORY MEDICATION CHARGE: Performed by: PHYSICIAN ASSISTANT

## 2023-01-23 RX ORDER — LOSARTAN POTASSIUM 25 MG/1
25 TABLET ORAL DAILY
Qty: 90 TABLET | Refills: 0 | Status: SHIPPED | OUTPATIENT
Start: 2023-01-23 | End: 2023-04-14 | Stop reason: SDUPTHER

## 2023-01-23 RX ORDER — ATORVASTATIN CALCIUM 40 MG/1
40 TABLET, FILM COATED ORAL DAILY
Qty: 90 TABLET | Refills: 0 | Status: SHIPPED | OUTPATIENT
Start: 2023-01-23 | End: 2023-04-14 | Stop reason: SDUPTHER

## 2023-01-23 RX ORDER — ASPIRIN 81 MG/1
81 TABLET ORAL DAILY
Qty: 90 TABLET | Refills: 0 | Status: SHIPPED | OUTPATIENT
Start: 2023-01-23 | End: 2023-04-14 | Stop reason: SDUPTHER

## 2023-01-23 NOTE — TELEPHONE ENCOUNTER
Received request via: Pharmacy    Was the patient seen in the last year in this department? Yes  11/2/22  Does the patient have an active prescription (recently filled or refills available) for medication(s) requested? No    Does the patient have long-term Plus and need 100 day supply (blood pressure, diabetes and cholesterol meds only)?

## 2023-01-24 ENCOUNTER — PHARMACY VISIT (OUTPATIENT)
Dept: PHARMACY | Facility: MEDICAL CENTER | Age: 68
End: 2023-01-24
Payer: COMMERCIAL

## 2023-04-12 ENCOUNTER — OFFICE VISIT (OUTPATIENT)
Dept: URGENT CARE | Facility: CLINIC | Age: 68
End: 2023-04-12
Payer: MEDICARE

## 2023-04-12 VITALS
RESPIRATION RATE: 16 BRPM | TEMPERATURE: 98.2 F | BODY MASS INDEX: 31.64 KG/M2 | HEIGHT: 72 IN | DIASTOLIC BLOOD PRESSURE: 84 MMHG | OXYGEN SATURATION: 94 % | WEIGHT: 233.6 LBS | HEART RATE: 64 BPM | SYSTOLIC BLOOD PRESSURE: 128 MMHG

## 2023-04-12 DIAGNOSIS — J06.9 VIRAL URI WITH COUGH: ICD-10-CM

## 2023-04-12 LAB
FLUAV RNA SPEC QL NAA+PROBE: NEGATIVE
FLUBV RNA SPEC QL NAA+PROBE: NEGATIVE
RSV RNA SPEC QL NAA+PROBE: NEGATIVE
SARS-COV-2 RNA RESP QL NAA+PROBE: NEGATIVE

## 2023-04-12 PROCEDURE — 99213 OFFICE O/P EST LOW 20 MIN: CPT | Performed by: NURSE PRACTITIONER

## 2023-04-12 PROCEDURE — 0241U POCT CEPHEID COV-2, FLU A/B, RSV - PCR: CPT | Performed by: NURSE PRACTITIONER

## 2023-04-12 RX ORDER — ALBUTEROL SULFATE 90 UG/1
1-2 AEROSOL, METERED RESPIRATORY (INHALATION) EVERY 4 HOURS PRN
Qty: 8 G | Refills: 0 | Status: SHIPPED | OUTPATIENT
Start: 2023-04-12

## 2023-04-12 RX ORDER — BENZONATATE 200 MG/1
200 CAPSULE ORAL EVERY 8 HOURS PRN
Qty: 30 CAPSULE | Refills: 0 | Status: SHIPPED | OUTPATIENT
Start: 2023-04-12 | End: 2023-12-20

## 2023-04-12 ASSESSMENT — ENCOUNTER SYMPTOMS
WHEEZING: 1
CARDIOVASCULAR NEGATIVE: 1
CONSTITUTIONAL NEGATIVE: 1
SHORTNESS OF BREATH: 1
COUGH: 1
FEVER: 0
SPUTUM PRODUCTION: 1

## 2023-04-12 ASSESSMENT — FIBROSIS 4 INDEX: FIB4 SCORE: 1.68

## 2023-04-12 ASSESSMENT — VISUAL ACUITY: OU: 1

## 2023-04-12 NOTE — PROGRESS NOTES
"Subjective:     Aldair Winter is a 67 y.o. male who presents for Cough (Started Sunday evening, \"Chest congestion, unable to sleep due to the cough.\" )       Cough  This is a new problem. Episode onset: Sunday. The problem has been gradually worsening. The cough is Productive of sputum. Associated symptoms include shortness of breath and wheezing. Pertinent negatives include no fever. Treatments tried: DayQuil/NyQuil. The treatment provided no relief. There is no history of asthma.     Exposed to group of people who have also been ill.    Review of Systems   Constitutional: Negative.  Negative for fever and malaise/fatigue.   HENT: Negative.  Negative for congestion.    Respiratory:  Positive for cough, sputum production, shortness of breath and wheezing.    Cardiovascular: Negative.    All other systems reviewed and are negative.    Refer to HPI for additional details.    During this visit, appropriate PPE was worn, hand hygiene was performed, and the patient and any visitors were masked.    PMH:  has a past medical history of Heart attack (HCC) (2019).    MEDS:   Current Outpatient Medications:     benzonatate (TESSALON) 200 MG capsule, Take 1 Capsule by mouth every 8 hours as needed for Cough., Disp: 30 Capsule, Rfl: 0    albuterol 108 (90 Base) MCG/ACT Aero Soln inhalation aerosol, Inhale 1-2 Puffs every four hours as needed for Shortness of Breath (and/or wheezing)., Disp: 8 g, Rfl: 0    losartan (COZAAR) 25 MG Tab, Take 1 Tablet by mouth every day for 360 days., Disp: 90 Tablet, Rfl: 0    aspirin 81 MG EC tablet, Take 1 Tablet by mouth every day., Disp: 90 Tablet, Rfl: 0    atorvastatin (LIPITOR) 40 MG Tab, Take 1 Tablet by mouth every day., Disp: 90 Tablet, Rfl: 0    ALLERGIES: No Known Allergies  SURGHX: History reviewed. No pertinent surgical history.  SOCHX:  reports that he has never smoked. He has never used smokeless tobacco. He reports current alcohol use of about 3.6 oz per week. He reports that " he does not currently use drugs.    FH: Per HPI as applicable/pertinent.      Objective:     /84 (BP Location: Right arm, Patient Position: Sitting, BP Cuff Size: Adult)   Pulse 64   Temp 36.8 °C (98.2 °F) (Temporal)   Resp 16   Ht 1.829 m (6')   Wt 106 kg (233 lb 9.6 oz)   SpO2 94%   BMI 31.68 kg/m²     Physical Exam  Nursing note reviewed.   Constitutional:       General: He is not in acute distress.     Appearance: He is well-developed. He is not ill-appearing or toxic-appearing.   HENT:      Head: Normocephalic.   Eyes:      General: Vision grossly intact.      Extraocular Movements: Extraocular movements intact.   Cardiovascular:      Rate and Rhythm: Normal rate and regular rhythm.      Heart sounds: Normal heart sounds.   Pulmonary:      Effort: Pulmonary effort is normal. No respiratory distress.      Breath sounds: Normal breath sounds. No stridor. No decreased breath sounds, wheezing, rhonchi or rales.   Musculoskeletal:         General: No deformity. Normal range of motion.      Cervical back: Normal range of motion.   Skin:     General: Skin is warm and dry.      Coloration: Skin is not pale.   Neurological:      Mental Status: He is alert and oriented to person, place, and time.      Motor: No weakness.   Psychiatric:         Behavior: Behavior normal. Behavior is cooperative.       Assessment/Plan:     1. Viral URI with cough  - benzonatate (TESSALON) 200 MG capsule; Take 1 Capsule by mouth every 8 hours as needed for Cough.  Dispense: 30 Capsule; Refill: 0  - albuterol 108 (90 Base) MCG/ACT Aero Soln inhalation aerosol; Inhale 1-2 Puffs every four hours as needed for Shortness of Breath (and/or wheezing).  Dispense: 8 g; Refill: 0  - POCT CoV-2, Flu A/B, RSV by PCR    Discussed likely self-limiting viral etiology and expected course and duration of illness. Vital signs stable, afebrile, no acute distress at this time. Swab pending; will contact patient.    Rx as above sent electronically.      Differential diagnosis, natural history, supportive care, over-the-counter symptom management per 's instructions, close monitoring, and indications for immediate follow-up discussed.     All questions answered. Patient agrees with the plan of care.    Discharge summary provided via [facility].

## 2023-04-13 ENCOUNTER — PATIENT MESSAGE (OUTPATIENT)
Dept: MEDICAL GROUP | Facility: LAB | Age: 68
End: 2023-04-13
Payer: MEDICARE

## 2023-04-13 RX ORDER — DEXTROMETHORPHAN HBR. AND GUAIFENESIN 10; 100 MG/5ML; MG/5ML
10 SOLUTION ORAL EVERY 4 HOURS PRN
Qty: 236 ML | Refills: 0 | Status: SHIPPED | OUTPATIENT
Start: 2023-04-13 | End: 2023-04-13 | Stop reason: SDUPTHER

## 2023-04-13 RX ORDER — DEXTROMETHORPHAN HBR. AND GUAIFENESIN 10; 100 MG/5ML; MG/5ML
10 SOLUTION ORAL EVERY 4 HOURS PRN
Qty: 180 ML | Refills: 0 | Status: SHIPPED | OUTPATIENT
Start: 2023-04-13 | End: 2023-04-27

## 2023-04-13 NOTE — TELEPHONE ENCOUNTER
Script not found at pharmacy please advise new one, Pharmacy stated only had about 150ml       guaifenesin dextromethorphan sugar free (DIABETIC TUSSIN DM)  MG/5ML Liquid soln

## 2023-04-14 DIAGNOSIS — I25.10 CORONARY ARTERY DISEASE INVOLVING NATIVE CORONARY ARTERY OF NATIVE HEART WITHOUT ANGINA PECTORIS: ICD-10-CM

## 2023-04-14 DIAGNOSIS — I10 ESSENTIAL HYPERTENSION: ICD-10-CM

## 2023-04-18 PROCEDURE — RXMED WILLOW AMBULATORY MEDICATION CHARGE: Performed by: FAMILY MEDICINE

## 2023-04-18 RX ORDER — LOSARTAN POTASSIUM 25 MG/1
25 TABLET ORAL DAILY
Qty: 90 TABLET | Refills: 2 | Status: SHIPPED | OUTPATIENT
Start: 2023-04-18 | End: 2023-10-16 | Stop reason: SDUPTHER

## 2023-04-18 RX ORDER — ASPIRIN 81 MG/1
81 TABLET ORAL DAILY
Qty: 90 TABLET | Refills: 2 | Status: SHIPPED | OUTPATIENT
Start: 2023-04-18

## 2023-04-18 RX ORDER — ATORVASTATIN CALCIUM 40 MG/1
40 TABLET, FILM COATED ORAL DAILY
Qty: 90 TABLET | Refills: 2 | Status: SHIPPED | OUTPATIENT
Start: 2023-04-18 | End: 2023-10-16 | Stop reason: SDUPTHER

## 2023-04-20 ENCOUNTER — PHARMACY VISIT (OUTPATIENT)
Dept: PHARMACY | Facility: MEDICAL CENTER | Age: 68
End: 2023-04-20
Payer: COMMERCIAL

## 2023-07-17 PROCEDURE — RXMED WILLOW AMBULATORY MEDICATION CHARGE: Performed by: FAMILY MEDICINE

## 2023-07-18 ENCOUNTER — PHARMACY VISIT (OUTPATIENT)
Dept: PHARMACY | Facility: MEDICAL CENTER | Age: 68
End: 2023-07-18
Payer: COMMERCIAL

## 2023-10-16 DIAGNOSIS — I10 ESSENTIAL HYPERTENSION: ICD-10-CM

## 2023-10-16 PROCEDURE — RXMED WILLOW AMBULATORY MEDICATION CHARGE: Performed by: FAMILY MEDICINE

## 2023-10-17 PROCEDURE — RXMED WILLOW AMBULATORY MEDICATION CHARGE: Performed by: FAMILY MEDICINE

## 2023-10-17 RX ORDER — LOSARTAN POTASSIUM 25 MG/1
25 TABLET ORAL DAILY
Qty: 100 TABLET | Refills: 0 | Status: SHIPPED | OUTPATIENT
Start: 2023-10-17 | End: 2024-02-13 | Stop reason: SDUPTHER

## 2023-10-17 RX ORDER — ATORVASTATIN CALCIUM 40 MG/1
40 TABLET, FILM COATED ORAL DAILY
Qty: 100 TABLET | Refills: 0 | Status: SHIPPED | OUTPATIENT
Start: 2023-10-17 | End: 2024-02-13 | Stop reason: SDUPTHER

## 2023-10-18 ENCOUNTER — PHARMACY VISIT (OUTPATIENT)
Dept: PHARMACY | Facility: MEDICAL CENTER | Age: 68
End: 2023-10-18
Payer: COMMERCIAL

## 2023-11-30 ENCOUNTER — PATIENT MESSAGE (OUTPATIENT)
Dept: MEDICAL GROUP | Facility: LAB | Age: 68
End: 2023-11-30
Payer: MEDICARE

## 2023-11-30 DIAGNOSIS — Z63.8 FAMILY CONFLICT: ICD-10-CM

## 2023-11-30 SDOH — SOCIAL STABILITY - SOCIAL INSECURITY: OTHER SPECIFIED PROBLEMS RELATED TO PRIMARY SUPPORT GROUP: Z63.8

## 2023-12-10 ENCOUNTER — OFFICE VISIT (OUTPATIENT)
Dept: URGENT CARE | Facility: CLINIC | Age: 68
End: 2023-12-10
Payer: MEDICARE

## 2023-12-10 VITALS
HEIGHT: 72 IN | WEIGHT: 228 LBS | SYSTOLIC BLOOD PRESSURE: 130 MMHG | TEMPERATURE: 98.7 F | DIASTOLIC BLOOD PRESSURE: 78 MMHG | BODY MASS INDEX: 30.88 KG/M2 | HEART RATE: 56 BPM | RESPIRATION RATE: 16 BRPM | OXYGEN SATURATION: 97 %

## 2023-12-10 DIAGNOSIS — J06.9 VIRAL URI WITH COUGH: ICD-10-CM

## 2023-12-10 DIAGNOSIS — J10.1 INFLUENZA A: ICD-10-CM

## 2023-12-10 LAB
FLUAV RNA SPEC QL NAA+PROBE: POSITIVE
FLUBV RNA SPEC QL NAA+PROBE: NEGATIVE
RSV RNA SPEC QL NAA+PROBE: NEGATIVE
SARS-COV-2 RNA RESP QL NAA+PROBE: NEGATIVE

## 2023-12-10 PROCEDURE — 3078F DIAST BP <80 MM HG: CPT | Performed by: STUDENT IN AN ORGANIZED HEALTH CARE EDUCATION/TRAINING PROGRAM

## 2023-12-10 PROCEDURE — 3075F SYST BP GE 130 - 139MM HG: CPT | Performed by: STUDENT IN AN ORGANIZED HEALTH CARE EDUCATION/TRAINING PROGRAM

## 2023-12-10 PROCEDURE — 0241U POCT CEPHEID COV-2, FLU A/B, RSV - PCR: CPT | Performed by: STUDENT IN AN ORGANIZED HEALTH CARE EDUCATION/TRAINING PROGRAM

## 2023-12-10 PROCEDURE — 99213 OFFICE O/P EST LOW 20 MIN: CPT | Performed by: STUDENT IN AN ORGANIZED HEALTH CARE EDUCATION/TRAINING PROGRAM

## 2023-12-10 RX ORDER — ALBUTEROL SULFATE 90 UG/1
2 AEROSOL, METERED RESPIRATORY (INHALATION) EVERY 6 HOURS PRN
Qty: 8.5 G | Refills: 0 | Status: SHIPPED | OUTPATIENT
Start: 2023-12-10

## 2023-12-10 RX ORDER — BENZONATATE 100 MG/1
100 CAPSULE ORAL 3 TIMES DAILY PRN
Qty: 60 CAPSULE | Refills: 0 | Status: SHIPPED | OUTPATIENT
Start: 2023-12-10 | End: 2023-12-20

## 2023-12-10 RX ORDER — DEXTROMETHORPHAN HBR. AND GUAIFENESIN 10; 100 MG/5ML; MG/5ML
10 SOLUTION ORAL EVERY 4 HOURS PRN
Qty: 180 ML | Refills: 0 | Status: SHIPPED | OUTPATIENT
Start: 2023-12-10 | End: 2023-12-20

## 2023-12-10 ASSESSMENT — ENCOUNTER SYMPTOMS
COUGH: 1
DIARRHEA: 0
CHILLS: 1
SHORTNESS OF BREATH: 1
NAUSEA: 0
CONSTIPATION: 0
SORE THROAT: 0
WHEEZING: 1
PALPITATIONS: 0
ABDOMINAL PAIN: 0
FEVER: 0
VOMITING: 0
MYALGIAS: 1

## 2023-12-10 ASSESSMENT — FIBROSIS 4 INDEX: FIB4 SCORE: 1.71

## 2023-12-10 NOTE — PROGRESS NOTES
"Subjective     Jeanjake Winter is a 68 y.o. male who presents with Cough (Started Friday, \"Upper respiratory congestion, SOB.\" )            Jean is a 68 y.o. male who presents to urgent care with symptoms of cough, nasal congestion/runny nose, chills and bodyaches.  Symptoms started approximately 2 days ago.  Patient currently on day #3 of symptoms.  Patient states last night his symptoms were the worst.  Patient does report symptoms of shortness of breath/wheezing.  Patient states he had an old albuterol inhaler at home which she used last night which did seem to help with shortness of breath/wheezing.  No fever but has had chills.  Patient is also taking OTC DayQuil/NyQuil which has provided some relief of symptoms.    Cough  This is a new problem. Episode onset: 2 days ago. The cough is Productive of sputum. Associated symptoms include chills, myalgias, nasal congestion, postnasal drip, shortness of breath and wheezing. Pertinent negatives include no chest pain, ear congestion, ear pain, fever or sore throat.       Review of Systems   Constitutional:  Positive for chills and malaise/fatigue. Negative for fever.   HENT:  Positive for congestion and postnasal drip. Negative for ear pain and sore throat.    Respiratory:  Positive for cough, shortness of breath and wheezing.    Cardiovascular:  Negative for chest pain and palpitations.   Gastrointestinal:  Negative for abdominal pain, constipation, diarrhea, nausea and vomiting.   Musculoskeletal:  Positive for myalgias.   All other systems reviewed and are negative.             Objective     /78 (BP Location: Left arm, Patient Position: Sitting, BP Cuff Size: Adult)   Pulse (!) 56   Temp 37.1 °C (98.7 °F) (Temporal)   Resp 16   Ht 1.829 m (6')   Wt 103 kg (228 lb)   SpO2 97%   BMI 30.92 kg/m²      Physical Exam  Vitals reviewed.   Constitutional:       General: He is not in acute distress.     Appearance: Normal appearance. He is not toxic-appearing. "   HENT:      Head: Normocephalic and atraumatic.      Nose: Nose normal.   Eyes:      Extraocular Movements: Extraocular movements intact.      Conjunctiva/sclera: Conjunctivae normal.      Pupils: Pupils are equal, round, and reactive to light.   Cardiovascular:      Rate and Rhythm: Normal rate and regular rhythm.   Pulmonary:      Effort: Pulmonary effort is normal. No respiratory distress.      Breath sounds: Normal breath sounds. No stridor. No wheezing, rhonchi or rales.   Skin:     General: Skin is warm and dry.   Neurological:      General: No focal deficit present.      Mental Status: He is alert. Mental status is at baseline.                     Assessment & Plan            1. Influenza A  -  POCT CoV-2, Flu A/B, RSV by PCR: POSITIVE FLU A.    2. Viral URI with cough  - Day #3 of symptoms.  - POCT CoV-2, Flu A/B, RSV by PCR  - benzonatate (TESSALON) 100 MG Cap; Take 1 Capsule by mouth 3 times a day as needed for Cough.  Dispense: 60 Capsule; Refill: 0  - guaifenesin dextromethorphan sugar free (DIABETIC TUSSIN DM)  MG/5ML Liquid soln; Take 10 mL by mouth every four hours as needed for Cough.  Dispense: 180 mL; Refill: 0   - albuterol 108 (90 Base) MCG/ACT Aero Soln inhalation aerosol; Inhale 2 Puffs every 6 hours as needed for Shortness of Breath.  Dispense: 8.5 g; Refill: 0     Differential diagnoses, supportive care measures and indications for immediate follow-up discussed with patient. Pathogenesis of diagnosis discussed including typical length and natural progression.      Instructed to return to urgent care or nearest emergency department if symptoms fail to improve, for any change in condition, further concerns, or new concerning symptoms.    Patient states understanding and agrees with the plan of care and discharge instructions.

## 2023-12-19 SDOH — ECONOMIC STABILITY: FOOD INSECURITY: WITHIN THE PAST 12 MONTHS, THE FOOD YOU BOUGHT JUST DIDN'T LAST AND YOU DIDN'T HAVE MONEY TO GET MORE.: NEVER TRUE

## 2023-12-19 SDOH — ECONOMIC STABILITY: TRANSPORTATION INSECURITY
IN THE PAST 12 MONTHS, HAS THE LACK OF TRANSPORTATION KEPT YOU FROM MEDICAL APPOINTMENTS OR FROM GETTING MEDICATIONS?: NO

## 2023-12-19 SDOH — ECONOMIC STABILITY: INCOME INSECURITY: IN THE LAST 12 MONTHS, WAS THERE A TIME WHEN YOU WERE NOT ABLE TO PAY THE MORTGAGE OR RENT ON TIME?: NO

## 2023-12-19 SDOH — ECONOMIC STABILITY: HOUSING INSECURITY
IN THE LAST 12 MONTHS, WAS THERE A TIME WHEN YOU DID NOT HAVE A STEADY PLACE TO SLEEP OR SLEPT IN A SHELTER (INCLUDING NOW)?: NO

## 2023-12-19 SDOH — HEALTH STABILITY: MENTAL HEALTH
STRESS IS WHEN SOMEONE FEELS TENSE, NERVOUS, ANXIOUS, OR CAN'T SLEEP AT NIGHT BECAUSE THEIR MIND IS TROUBLED. HOW STRESSED ARE YOU?: ONLY A LITTLE

## 2023-12-19 SDOH — ECONOMIC STABILITY: FOOD INSECURITY: WITHIN THE PAST 12 MONTHS, YOU WORRIED THAT YOUR FOOD WOULD RUN OUT BEFORE YOU GOT MONEY TO BUY MORE.: NEVER TRUE

## 2023-12-19 SDOH — ECONOMIC STABILITY: HOUSING INSECURITY: IN THE LAST 12 MONTHS, HOW MANY PLACES HAVE YOU LIVED?: 1

## 2023-12-19 SDOH — HEALTH STABILITY: PHYSICAL HEALTH: ON AVERAGE, HOW MANY MINUTES DO YOU ENGAGE IN EXERCISE AT THIS LEVEL?: 30 MIN

## 2023-12-19 SDOH — ECONOMIC STABILITY: TRANSPORTATION INSECURITY
IN THE PAST 12 MONTHS, HAS LACK OF TRANSPORTATION KEPT YOU FROM MEETINGS, WORK, OR FROM GETTING THINGS NEEDED FOR DAILY LIVING?: NO

## 2023-12-19 SDOH — HEALTH STABILITY: PHYSICAL HEALTH: ON AVERAGE, HOW MANY DAYS PER WEEK DO YOU ENGAGE IN MODERATE TO STRENUOUS EXERCISE (LIKE A BRISK WALK)?: 6 DAYS

## 2023-12-19 SDOH — ECONOMIC STABILITY: TRANSPORTATION INSECURITY
IN THE PAST 12 MONTHS, HAS LACK OF RELIABLE TRANSPORTATION KEPT YOU FROM MEDICAL APPOINTMENTS, MEETINGS, WORK OR FROM GETTING THINGS NEEDED FOR DAILY LIVING?: NO

## 2023-12-19 SDOH — ECONOMIC STABILITY: INCOME INSECURITY: HOW HARD IS IT FOR YOU TO PAY FOR THE VERY BASICS LIKE FOOD, HOUSING, MEDICAL CARE, AND HEATING?: NOT HARD AT ALL

## 2023-12-19 ASSESSMENT — SOCIAL DETERMINANTS OF HEALTH (SDOH)
WITHIN THE PAST 12 MONTHS, YOU WORRIED THAT YOUR FOOD WOULD RUN OUT BEFORE YOU GOT THE MONEY TO BUY MORE: NEVER TRUE
HOW OFTEN DO YOU ATTENT MEETINGS OF THE CLUB OR ORGANIZATION YOU BELONG TO?: MORE THAN 4 TIMES PER YEAR
HOW OFTEN DO YOU GET TOGETHER WITH FRIENDS OR RELATIVES?: THREE TIMES A WEEK
HOW HARD IS IT FOR YOU TO PAY FOR THE VERY BASICS LIKE FOOD, HOUSING, MEDICAL CARE, AND HEATING?: NOT HARD AT ALL
HOW OFTEN DO YOU HAVE A DRINK CONTAINING ALCOHOL: 4 OR MORE TIMES A WEEK
HOW OFTEN DO YOU ATTEND CHURCH OR RELIGIOUS SERVICES?: 1 TO 4 TIMES PER YEAR
DO YOU BELONG TO ANY CLUBS OR ORGANIZATIONS SUCH AS CHURCH GROUPS UNIONS, FRATERNAL OR ATHLETIC GROUPS, OR SCHOOL GROUPS?: YES
HOW OFTEN DO YOU GET TOGETHER WITH FRIENDS OR RELATIVES?: THREE TIMES A WEEK
HOW OFTEN DO YOU HAVE SIX OR MORE DRINKS ON ONE OCCASION: NEVER
DO YOU BELONG TO ANY CLUBS OR ORGANIZATIONS SUCH AS CHURCH GROUPS UNIONS, FRATERNAL OR ATHLETIC GROUPS, OR SCHOOL GROUPS?: YES
HOW MANY DRINKS CONTAINING ALCOHOL DO YOU HAVE ON A TYPICAL DAY WHEN YOU ARE DRINKING: 1 OR 2
HOW OFTEN DO YOU ATTEND CHURCH OR RELIGIOUS SERVICES?: 1 TO 4 TIMES PER YEAR
HOW OFTEN DO YOU ATTENT MEETINGS OF THE CLUB OR ORGANIZATION YOU BELONG TO?: MORE THAN 4 TIMES PER YEAR
IN A TYPICAL WEEK, HOW MANY TIMES DO YOU TALK ON THE PHONE WITH FAMILY, FRIENDS, OR NEIGHBORS?: MORE THAN THREE TIMES A WEEK
IN A TYPICAL WEEK, HOW MANY TIMES DO YOU TALK ON THE PHONE WITH FAMILY, FRIENDS, OR NEIGHBORS?: MORE THAN THREE TIMES A WEEK

## 2023-12-19 ASSESSMENT — LIFESTYLE VARIABLES
HOW OFTEN DO YOU HAVE A DRINK CONTAINING ALCOHOL: 4 OR MORE TIMES A WEEK
HOW MANY STANDARD DRINKS CONTAINING ALCOHOL DO YOU HAVE ON A TYPICAL DAY: 1 OR 2
SKIP TO QUESTIONS 9-10: 1
AUDIT-C TOTAL SCORE: 4
HOW OFTEN DO YOU HAVE SIX OR MORE DRINKS ON ONE OCCASION: NEVER

## 2023-12-20 ENCOUNTER — OFFICE VISIT (OUTPATIENT)
Dept: MEDICAL GROUP | Facility: LAB | Age: 68
End: 2023-12-20
Payer: MEDICARE

## 2023-12-20 VITALS
OXYGEN SATURATION: 94 % | HEART RATE: 58 BPM | RESPIRATION RATE: 16 BRPM | HEIGHT: 74 IN | TEMPERATURE: 97.6 F | WEIGHT: 230 LBS | DIASTOLIC BLOOD PRESSURE: 72 MMHG | SYSTOLIC BLOOD PRESSURE: 116 MMHG | BODY MASS INDEX: 29.52 KG/M2

## 2023-12-20 DIAGNOSIS — R73.9 BLOOD GLUCOSE ELEVATED: ICD-10-CM

## 2023-12-20 DIAGNOSIS — Z12.5 ENCOUNTER FOR SCREENING FOR MALIGNANT NEOPLASM OF PROSTATE: ICD-10-CM

## 2023-12-20 DIAGNOSIS — E78.2 MIXED HYPERLIPIDEMIA: ICD-10-CM

## 2023-12-20 DIAGNOSIS — I10 ESSENTIAL HYPERTENSION: ICD-10-CM

## 2023-12-20 DIAGNOSIS — G62.9 NEUROPATHY: ICD-10-CM

## 2023-12-20 DIAGNOSIS — I25.10 CORONARY ARTERY DISEASE INVOLVING NATIVE CORONARY ARTERY OF NATIVE HEART WITHOUT ANGINA PECTORIS: ICD-10-CM

## 2023-12-20 DIAGNOSIS — Z00.00 ANNUAL PHYSICAL EXAM: ICD-10-CM

## 2023-12-20 PROCEDURE — 3078F DIAST BP <80 MM HG: CPT | Performed by: FAMILY MEDICINE

## 2023-12-20 PROCEDURE — 99214 OFFICE O/P EST MOD 30 MIN: CPT | Mod: 25 | Performed by: FAMILY MEDICINE

## 2023-12-20 PROCEDURE — 99397 PER PM REEVAL EST PAT 65+ YR: CPT | Performed by: FAMILY MEDICINE

## 2023-12-20 PROCEDURE — 3074F SYST BP LT 130 MM HG: CPT | Performed by: FAMILY MEDICINE

## 2023-12-20 ASSESSMENT — FIBROSIS 4 INDEX: FIB4 SCORE: 1.71

## 2023-12-20 NOTE — PROGRESS NOTES
Subjective:   Aldair Winter is a 68 y.o. male here today for   Chief Complaint   Patient presents with    Annual Exam     SC[facility]     Requesting Labs     #Annual   -Reviewed all past medical history, family history, social history.  -Reviewed all screening/vaccinations: Patient due for pneumococcal, influenza vaccine.  Recently contracted influenza A last week.  Would like to wait for further vaccines at this time.  Due for labs including PSA, hemoglobin A1c, lipid profile.  -Diet and Exercise: Staying active, working on improving diet and exercise.  Working on decreasing alcohol use.  -Tobacco, alcohol, recreational drug use: Early alcohol use, 1-2 drinks.  Denies any tobacco, drug use.  -Sexually active: No new sexual partners.    # Hypertension  Doing well.  Reviewed labs with the patient. Taking medications as prescribed. No chest pain palpitations or shortness of breath. No headache loss or change in vision.    # CAD:  -Status post placement 2017.  Currently on aspirin, statin.  Working on improving diet and exercise regimen.  Asymptomatic at this time.  Continue follow-up with cardiologist on a yearly basis.    #HLD  Dyslipidemia: Chronic condition. Last labs are at goal.  Currently taking atorvastatin as directed.  Denies side effects--no myalgias or abdominal pain.  Reports diet is:   He is exercising regularly.  He denies dizziness, claudication, or chest pain.  Cholesterol,Tot   Date Value Ref Range Status   11/04/2022 126 100 - 199 mg/dL Final     HDL   Date Value Ref Range Status   11/04/2022 41 >=40 mg/dL Final     LDL   Date Value Ref Range Status   11/04/2022 71 <100 mg/dL Final     Triglycerides   Date Value Ref Range Status   11/04/2022 68 0 - 149 mg/dL Final       # Neuropathy:  -New symptoms.  -Over the last month patient's been noticing numbness, tingling, occasional pain at the bottom of his feet and across the toes.  Symptoms are present daily; however, worse throughout the night.  He  "states wearing socks at night definitely helps.  Denies any back pain, radiculopathy.  No history of diabetes.  No prior injury or pertinent medical history.    No Known Allergies      Current medicines (including changes today)  Current Outpatient Medications   Medication Sig Dispense Refill    benzonatate (TESSALON) 100 MG Cap Take 1 Capsule by mouth 3 times a day as needed for Cough. 60 Capsule 0    albuterol 108 (90 Base) MCG/ACT Aero Soln inhalation aerosol Inhale 2 Puffs every 6 hours as needed for Shortness of Breath. 8.5 g 0    guaifenesin dextromethorphan sugar free (DIABETIC TUSSIN DM)  MG/5ML Liquid soln Take 10 mL by mouth every four hours as needed for Cough. 180 mL 0    losartan (COZAAR) 25 MG Tab Take 1 Tablet by mouth every day for 360 days. 100 Tablet 0    atorvastatin (LIPITOR) 40 MG Tab Take 1 Tablet by mouth every day. 100 Tablet 0    aspirin (ADULT ASPIRIN REGIMEN) 81 MG EC tablet Take 1 Tablet by mouth every day. 90 Tablet 2    benzonatate (TESSALON) 200 MG capsule Take 1 Capsule by mouth every 8 hours as needed for Cough. 30 Capsule 0    albuterol 108 (90 Base) MCG/ACT Aero Soln inhalation aerosol Inhale 1-2 Puffs every four hours as needed for Shortness of Breath (and/or wheezing). 8 g 0     No current facility-administered medications for this visit.     He  has a past medical history of Heart attack (HCC) (2019).    ROS   -See HPI       Objective:     Physical Exam:  /72   Pulse (!) 58   Temp 36.4 °C (97.6 °F) (Temporal)   Resp 16   Ht 1.88 m (6' 2.02\")   Wt 104 kg (230 lb)   SpO2 94%  Body mass index is 29.52 kg/m².   Constitutional: Alert, no distress.  Skin: Warm, dry, good turgor, no rashes in visible areas.  Eye: Equal, round and reactive, conjunctiva clear, lids normal.  ENMT: TM's clear bilaterally, lips without lesions, good dentition, oropharynx clear.  Neck: Trachea midline, no masses, no thyromegaly. No cervical or supraclavicular lymphadenopathy.  Respiratory: " Unlabored respiratory effort, lungs clear to auscultation, no wheezes, no rhonchi.  Cardiovascular: Normal S1, S2, no murmur, no edema.  Abdomen: Soft, non-tender, no masses, no hepatosplenomegaly.  Psych: Alert and oriented x3, normal affect and mood.    Assessment and Plan:     1. Annual physical exam  -All questions concerns were answered at this time.  -Vaccinations/screenings needed at this time: We will defer vaccines at this time.  Labs ordered as below.  -Labs reviewed, no concerns, recheck labs as below.  -Discussed the importance of a healthy, Mediterranean style diet, routine exercise regimen.  -Discussed general safety measures including seatbelts, helmets, avoidance of smoking, sunscreen, hydration.  -Follow-up for general physical exam on a yearly basis, sooner if needed.    2. Essential hypertension  -Chronic condition, stable with no concerns.  Will continue with losartan daily.  Can do the appropriate diet and exercise regimen.  - Lipid Profile; Future  - Comp Metabolic Panel; Future    3. Mixed hyperlipidemia  -Chronic condition, stable with no concerns  - Lipid Profile; Future    4. Coronary artery disease involving native coronary artery of native heart without angina pectoris  -Chronic condition, stable.  Continue with aspirin and statin.  Continue follow-up with cardiology.  Strict return precautions given.    5. Neuropathy  -New problem, uncertain etiology/prognosis.  Patient has no history of diabetes.  Last fasting blood glucose was only at 101.  No previous injury.  This does not appear to be radiculopathy from a back injury.  At this time further evaluation is needed.  Will order x-rays of feet bilaterally as well as an EMG.  Return precautions given, patient will follow-up if symptoms worsen.  We did discuss potentially starting medication; however, patient would like to defer will follow-up if symptoms worsen.  - Referral to Neurodiagnostics (EEG,EP,EMG/NCS/DBS)  - DX-FOOT-COMPLETE 3+  LEFT; Future  - DX-FOOT-COMPLETE 3+ RIGHT; Future    6. Encounter for screening for malignant neoplasm of prostate  - PROSTATE SPECIFIC AG SCREENING; Future    7. Blood glucose elevated  - HEMOGLOBIN A1C; Future      Followup: No follow-ups on file.         PLEASE NOTE: This dictation was created using voice recognition software. I have made every reasonable attempt to correct obvious errors, but I expect that there are errors of grammar and possibly content that I did not discover before finalizing the note.

## 2023-12-22 ENCOUNTER — HOSPITAL ENCOUNTER (OUTPATIENT)
Dept: LAB | Facility: MEDICAL CENTER | Age: 68
End: 2023-12-22
Attending: FAMILY MEDICINE
Payer: MEDICARE

## 2023-12-22 DIAGNOSIS — I10 ESSENTIAL HYPERTENSION: ICD-10-CM

## 2023-12-22 DIAGNOSIS — Z12.5 ENCOUNTER FOR SCREENING FOR MALIGNANT NEOPLASM OF PROSTATE: ICD-10-CM

## 2023-12-22 DIAGNOSIS — R73.9 BLOOD GLUCOSE ELEVATED: ICD-10-CM

## 2023-12-22 DIAGNOSIS — E78.2 MIXED HYPERLIPIDEMIA: ICD-10-CM

## 2023-12-22 LAB
ALBUMIN SERPL BCP-MCNC: 4.3 G/DL (ref 3.2–4.9)
ALBUMIN/GLOB SERPL: 2.2 G/DL
ALP SERPL-CCNC: 51 U/L (ref 30–99)
ALT SERPL-CCNC: 38 U/L (ref 2–50)
ANION GAP SERPL CALC-SCNC: 8 MMOL/L (ref 7–16)
AST SERPL-CCNC: 22 U/L (ref 12–45)
BILIRUB SERPL-MCNC: 1.1 MG/DL (ref 0.1–1.5)
BUN SERPL-MCNC: 18 MG/DL (ref 8–22)
CALCIUM ALBUM COR SERPL-MCNC: 9.2 MG/DL (ref 8.5–10.5)
CALCIUM SERPL-MCNC: 9.4 MG/DL (ref 8.5–10.5)
CHLORIDE SERPL-SCNC: 105 MMOL/L (ref 96–112)
CHOLEST SERPL-MCNC: 105 MG/DL (ref 100–199)
CO2 SERPL-SCNC: 28 MMOL/L (ref 20–33)
CREAT SERPL-MCNC: 0.9 MG/DL (ref 0.5–1.4)
EST. AVERAGE GLUCOSE BLD GHB EST-MCNC: 114 MG/DL
FASTING STATUS PATIENT QL REPORTED: NORMAL
GFR SERPLBLD CREATININE-BSD FMLA CKD-EPI: 94 ML/MIN/1.73 M 2
GLOBULIN SER CALC-MCNC: 2 G/DL (ref 1.9–3.5)
GLUCOSE SERPL-MCNC: 106 MG/DL (ref 65–99)
HBA1C MFR BLD: 5.6 % (ref 4–5.6)
HDLC SERPL-MCNC: 42 MG/DL
LDLC SERPL CALC-MCNC: 48 MG/DL
POTASSIUM SERPL-SCNC: 4.5 MMOL/L (ref 3.6–5.5)
PROT SERPL-MCNC: 6.3 G/DL (ref 6–8.2)
PSA SERPL-MCNC: 0.35 NG/ML (ref 0–4)
SODIUM SERPL-SCNC: 141 MMOL/L (ref 135–145)
TRIGL SERPL-MCNC: 73 MG/DL (ref 0–149)

## 2023-12-22 PROCEDURE — 80053 COMPREHEN METABOLIC PANEL: CPT

## 2023-12-22 PROCEDURE — 84153 ASSAY OF PSA TOTAL: CPT

## 2023-12-22 PROCEDURE — 83036 HEMOGLOBIN GLYCOSYLATED A1C: CPT

## 2023-12-22 PROCEDURE — 80061 LIPID PANEL: CPT

## 2023-12-22 PROCEDURE — 36415 COLL VENOUS BLD VENIPUNCTURE: CPT

## 2024-01-02 ENCOUNTER — APPOINTMENT (OUTPATIENT)
Dept: RADIOLOGY | Facility: MEDICAL CENTER | Age: 69
End: 2024-01-02
Attending: FAMILY MEDICINE
Payer: MEDICARE

## 2024-01-02 DIAGNOSIS — G62.9 NEUROPATHY: ICD-10-CM

## 2024-01-02 PROCEDURE — 73630 X-RAY EXAM OF FOOT: CPT | Mod: RT

## 2024-01-02 PROCEDURE — 73630 X-RAY EXAM OF FOOT: CPT | Mod: LT

## 2024-01-09 ENCOUNTER — PATIENT MESSAGE (OUTPATIENT)
Dept: MEDICAL GROUP | Facility: LAB | Age: 69
End: 2024-01-09
Payer: MEDICARE

## 2024-01-09 DIAGNOSIS — M19.09 PRIMARY OSTEOARTHRITIS OF OTHER SITE: ICD-10-CM

## 2024-01-23 ENCOUNTER — NON-PROVIDER VISIT (OUTPATIENT)
Dept: NEUROLOGY | Facility: MEDICAL CENTER | Age: 69
End: 2024-01-23
Attending: SPECIALIST
Payer: MEDICARE

## 2024-01-23 DIAGNOSIS — G62.9 NEUROPATHY: ICD-10-CM

## 2024-01-23 DIAGNOSIS — R20.0 NUMBNESS: ICD-10-CM

## 2024-01-23 PROCEDURE — 95886 MUSC TEST DONE W/N TEST COMP: CPT | Performed by: SPECIALIST

## 2024-01-23 PROCEDURE — 95909 NRV CNDJ TST 5-6 STUDIES: CPT | Mod: 26 | Performed by: SPECIALIST

## 2024-01-23 PROCEDURE — 95909 NRV CNDJ TST 5-6 STUDIES: CPT | Performed by: SPECIALIST

## 2024-01-23 PROCEDURE — 95886 MUSC TEST DONE W/N TEST COMP: CPT | Mod: 26 | Performed by: SPECIALIST

## 2024-01-23 NOTE — PROCEDURES
NERVE CONDUCTION STUDIES AND ELECTROMYOGRAPHY REPORT        01/23/24      Referring provider: Russell Lobo M.D.      SUMMARY OF PATIENT'S CLINICAL HISTORY,PHYSICAL EXAM, AND RATIONALE FOR TESTING:    Mr. Aldair Winter 68 y.o. presenting with numbness in his feet.    Past Medical History is significant for :   Past Medical History:   Diagnosis Date    Heart attack (HCC) 2019         The electrodiagnostic studies were performed to evaluate for possible neuropathy versus radiculopathy.      ELECTRODIAGNOSTIC EXAMINATION:  Nerve conduction studies (NCS) and electromyography (EMG) are utilized to evaluate direct or indirect damage to the peripheral nervous system. NCS are performed to measure the nerve(s) response(s) to electrostimulation across a given nerve segment. EMG evaluates the passive and active electrical activity of the muscle(s) in question.  Muscles are innervated by specific peripheral nerves and roots. Often times, several nerves the muscle to be examined in order to determine the presence or absence of the disease process. Furthermore, nerves and muscles may need to be tested in a [id number] comparison, as well as in additional extremities, as this may be crucial in characterizing the extent of the disease process, which may be diffuse or isolated and of varying degree of severity. The extent of the neurodiagnostic exam is justified as it may help arrive to a proper diagnosis, which ultimately may contribute to better management of the patient. Therefore, the nerves to muscles examined during the study were medically necessary.    Unless otherwise noted, temperature of the extremity(s) study was monitored before and during the examination and remained between 32 and 36 degrees C for the upper extremities, and between 30 and 36 degrees C for the lower extremities.      NERVE CONDUCTION STUDIES:  Sensory nerves:   - Bilateral Sural nerves were tested. The responses were within normal  limits.    Motor nerves:     - Bilateral Tibial nerves were examined. Recording electrodes placed at the Abductor Hallucis muscles. The responses were within normal limits.  - Bilateral Deep Peroneal motor nerves were examined. Recording electrodes were placed at the Extensor Digitorum Brevis muscles.The responses were within normal limits.     No temporal dispersion or conduction block observed in any of the motor nerves examined.      ELECTROMYOGRAPHY:  The study was performed the concentric needle electrode. Fibrillation and fasciculation activity is graded by convention from none (0) to continuous (4+).  Needle electrode examination was performed in the following muscles: Bilateral vastus lateralis, tibialis anterior, gastrocnemius, extensor digitorum brevis, abductor hallicus.  The muscles tested demonstrated normal insertional activity, normal motor unit morphology and recruitment. There were no elements suggestive of active denervation.    Nerve Conduction Studies     Stim Site NR Onset (ms) Norm Onset (ms) O-P Amp (µV) Norm O-P Amp Site1 Site2 Delta-P (ms) Dist (cm) Raphael (m/s) Norm Raphael (m/s)   Left Sural Anti Sensory (Lat Mall)   Calf    3.9 <4.6 4.9 >3 Calf Lat Mall 4.6 14.0 *30 >40   Right Sural Anti Sensory (Lat Mall)   Calf    4.1 <4.6 4.9 >3 Calf Lat Mall 5.4 14.0 *26 >40        Stim Site NR Onset (ms) Norm Onset (ms) O-P Amp (mV) Norm O-P Amp Site1 Site2 Delta-0 (ms) Dist (cm) Raphael (m/s) Norm Raphael (m/s)   Left Peroneal EDB Motor (Ext Dig Brev)   Ankle    5.2 <6 7.8 >2.5 B Fib Ankle 9.1 37.0 41 >40   B Fib    14.3  6.6  Poplt B Fib 2.2 10.0 45    Poplt    16.5  6.4          Right Peroneal EDB Motor (Ext Dig Brev)   Ankle    5.7 <6 6.5 >2.5 B Fib Ankle 9.1 37.0 41 >40   B Fib    14.8  4.2  Poplt B Fib 2.2 10.0 45    Poplt    17.0  4.3          Left Tibial Motor (Abd Tang Brev)   Ankle    4.6 <6 4.0 >4 Knee Ankle 11.3 45.0 40 >40   Knee    15.9  2.1          Right Tibial Motor (Abd Tang Brev)   Ankle    5.8  <6 7.7 >4 Knee Ankle 11.2 45.0 40 >40   Knee    17.0  4.7                          Electromyography     Side Muscle Nerve Root Ins Act Fibs Psw Amp Dur Poly Recrt Int Pat Comment   Left VastusLat Femoral L2-4 Nml Nml Nml Nml Nml 0 Nml Nml    Left AntTibialis Dp Br Fibular L4-5 Nml Nml Nml Nml Nml 0 Nml Nml    Left Gastroc Tibial S1-2 Nml Nml Nml Nml Nml 0 Nml Nml    Left Ext Dig Brev Dp Br Fibular L5, S1 Nml Nml Nml Nml Nml 0 Nml Nml    Left AbdHallucis MedPlantar S1-2 Nml Nml Nml Nml Nml 0 Nml Nml    Right VastusLat Femoral L2-4 Nml Nml Nml Nml Nml 0 Nml Nml    Right AntTibialis Dp Br Fibular L4-5 Nml Nml Nml Nml Nml 0 Nml Nml    Right Gastroc Tibial S1-2 Nml Nml Nml Nml Nml 0 Nml Nml    Right Ext Dig Brev Dp Br Fibular L5, S1 Nml Nml Nml Nml Nml 0 Nml Nml    Right AbdHallucis MedPlantar S1-2 Nml Nml Nml Nml Nml 0 Nml Nml              DIAGNOSTIC INTERPRETATION:   Extensive electrodiagnostic studies were performed to the neural lower extremities.  The results are as follows:    1.  Normal EMG and nerve conduction study left lower extremity.    2.  Normal EMG and nerve conduction study right lower extremity.    CLINICAL DISCUSSION:   Specifically, bilateral tibial and peroneal motor and sural sensory conduction studies were within normal limits.  There were no acute or chronic denervation changes in selected muscles studied in the bilateral lower extremities.  There is no evidence of bilateral lower extremity peripheral neuropathy, plexopathy or radiculopathy.  Clinical correlation is suggested.  A pure sensory small fiber neuropathy can present with normal nerve conduction studies.      PHIL Hurd M.D.(No note.)

## 2024-02-07 ENCOUNTER — OFFICE VISIT (OUTPATIENT)
Dept: MEDICAL GROUP | Facility: LAB | Age: 69
End: 2024-02-07
Payer: MEDICARE

## 2024-02-07 VITALS
DIASTOLIC BLOOD PRESSURE: 72 MMHG | HEART RATE: 79 BPM | TEMPERATURE: 97.2 F | BODY MASS INDEX: 30.16 KG/M2 | WEIGHT: 235 LBS | HEIGHT: 74 IN | SYSTOLIC BLOOD PRESSURE: 114 MMHG | RESPIRATION RATE: 16 BRPM | OXYGEN SATURATION: 98 %

## 2024-02-07 DIAGNOSIS — N52.9 ERECTILE DYSFUNCTION, UNSPECIFIED ERECTILE DYSFUNCTION TYPE: ICD-10-CM

## 2024-02-07 PROCEDURE — 3074F SYST BP LT 130 MM HG: CPT | Performed by: FAMILY MEDICINE

## 2024-02-07 PROCEDURE — 3078F DIAST BP <80 MM HG: CPT | Performed by: FAMILY MEDICINE

## 2024-02-07 PROCEDURE — 99214 OFFICE O/P EST MOD 30 MIN: CPT | Performed by: FAMILY MEDICINE

## 2024-02-07 RX ORDER — SILDENAFIL 50 MG/1
50 TABLET, FILM COATED ORAL
Qty: 30 TABLET | Refills: 3 | Status: SHIPPED | OUTPATIENT
Start: 2024-02-07

## 2024-02-07 ASSESSMENT — PATIENT HEALTH QUESTIONNAIRE - PHQ9: CLINICAL INTERPRETATION OF PHQ2 SCORE: 0

## 2024-02-07 ASSESSMENT — FIBROSIS 4 INDEX: FIB4 SCORE: 1.44

## 2024-02-07 NOTE — PROGRESS NOTES
"Subjective:   Aldair Winter is a 68 y.o. male here today for   No chief complaint on file.    #ED   -For the past 3 to 4 months patient been experiencing difficulty maintaining erection.  States he has no difficulty achieving erection, denies any changes to libido.  He states because this is having difficulty with orgasm and thus having some difficulty with the relationship with his wife.  -Patient does have history of stent placement.  He is exercising daily and denies any chest pain, shortness of breath or exercise intolerance.  Patient does endorse morning erections.  -He denies any abdominal pain, polyuria, dysuria, hematuria, difficulty starting/stopping urination, low flow.  Does have occasional nocturia.    No Known Allergies      Current medicines (including changes today)  Current Outpatient Medications   Medication Sig Dispense Refill    gabapentin (NEURONTIN) 100 MG Cap Take 1 Capsule by mouth 3 times a day. 42 Capsule 0    losartan (COZAAR) 25 MG Tab Take 1 Tablet by mouth every day for 360 days. 100 Tablet 0    atorvastatin (LIPITOR) 40 MG Tab Take 1 Tablet by mouth every day. 100 Tablet 0    albuterol 108 (90 Base) MCG/ACT Aero Soln inhalation aerosol Inhale 2 Puffs every 6 hours as needed for Shortness of Breath. 8.5 g 0    aspirin (ADULT ASPIRIN REGIMEN) 81 MG EC tablet Take 1 Tablet by mouth every day. 90 Tablet 2    albuterol 108 (90 Base) MCG/ACT Aero Soln inhalation aerosol Inhale 1-2 Puffs every four hours as needed for Shortness of Breath (and/or wheezing). 8 g 0     No current facility-administered medications for this visit.     He  has a past medical history of Heart attack (HCC) (2019).    ROS   -See HPI       Objective:     Physical Exam:  /72   Pulse 79   Temp 36.2 °C (97.2 °F) (Temporal)   Resp 16   Ht 1.88 m (6' 2.02\")   Wt 107 kg (235 lb)   SpO2 98%  Body mass index is 30.16 kg/m².   Constitutional: Alert, no distress, well-groomed.  Skin: No rashes in visible " areas.  Eye: Round. Conjunctiva clear, lids normal. No icterus.   ENMT: Lips pink without lesions, good dentition, moist mucous membranes. Phonation normal.  Neck: No masses, no thyromegaly. Moves freely without pain.  Respiratory: Unlabored respiratory effort, no cough or audible wheeze  Psych: Alert and oriented x3, normal affect and mood.  Assessment and Plan:     1. Erectile dysfunction, unspecified erectile dysfunction type  -New problem.  Patient does have a history of vascular disease; however, is able to exercise without difficulty.  No concerns regarding prostate at this time but we will continue to monitor symptoms.  In the meantime we will begin treatment with Viagra.  Discussed appropriate medication, potential side effects.  Reviewed labs including PSA, unremarkable.  Will continue with checking PSA on a regular basis.  - sildenafil citrate (VIAGRA) 50 MG tablet; Take 1 Tablet by mouth 1 time a day as needed for Erectile Dysfunction.  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 Continue with current defined treatment plan: Will continue monitoring symptoms.  Blood pressure is at goal.  No concerns.  Will continue with appropriate follow-up. Follow-up at least annually.  Last edited 02/07/24 10:18 PST by Russell Lobo M.D.           Followup: No follow-ups on file.         PLEASE NOTE: This dictation was created using voice recognition software. I have made every reasonable attempt to correct obvious errors, but I expect that there are errors of grammar and possibly content that I did not discover before finalizing the note.

## 2024-02-13 DIAGNOSIS — I10 ESSENTIAL HYPERTENSION: ICD-10-CM

## 2024-02-13 RX ORDER — ATORVASTATIN CALCIUM 40 MG/1
40 TABLET, FILM COATED ORAL DAILY
Qty: 100 TABLET | Refills: 3 | Status: SHIPPED | OUTPATIENT
Start: 2024-02-13

## 2024-02-13 RX ORDER — LOSARTAN POTASSIUM 25 MG/1
25 TABLET ORAL DAILY
Qty: 100 TABLET | Refills: 3 | Status: SHIPPED | OUTPATIENT
Start: 2024-02-13 | End: 2025-02-07

## 2024-02-13 NOTE — TELEPHONE ENCOUNTER
Received request via: Pharmacy    Was the patient seen in the last year in this department? Yes  2/7/24  Does the patient have an active prescription (recently filled or refills available) for medication(s) requested? No    Pharmacy Name: [facility]    Does the patient have [facility] and need 100 day supply (blood pressure, diabetes and cholesterol meds only)? Medication is not for cholesterol, blood pressure or diabetes

## 2024-02-14 PROCEDURE — RXMED WILLOW AMBULATORY MEDICATION CHARGE: Performed by: FAMILY MEDICINE

## 2024-02-15 ENCOUNTER — PHARMACY VISIT (OUTPATIENT)
Dept: PHARMACY | Facility: MEDICAL CENTER | Age: 69
End: 2024-02-15
Payer: COMMERCIAL

## 2024-02-19 PROCEDURE — RXMED WILLOW AMBULATORY MEDICATION CHARGE: Performed by: FAMILY MEDICINE

## 2024-02-21 ENCOUNTER — PHARMACY VISIT (OUTPATIENT)
Dept: PHARMACY | Facility: MEDICAL CENTER | Age: 69
End: 2024-02-21
Payer: COMMERCIAL

## 2024-04-01 ASSESSMENT — ACTIVITIES OF DAILY LIVING (ADL): BATHING_REQUIRES_ASSISTANCE: 0

## 2024-04-01 ASSESSMENT — PATIENT HEALTH QUESTIONNAIRE - PHQ9
1. LITTLE INTEREST OR PLEASURE IN DOING THINGS: NOT AT ALL
2. FEELING DOWN, DEPRESSED, IRRITABLE, OR HOPELESS: NOT AT ALL

## 2024-04-01 ASSESSMENT — ENCOUNTER SYMPTOMS: GENERAL WELL-BEING: GOOD

## 2024-04-02 ENCOUNTER — OFFICE VISIT (OUTPATIENT)
Dept: FAMILY PLANNING/WOMEN'S HEALTH CLINIC | Facility: PHYSICIAN GROUP | Age: 69
End: 2024-04-02
Attending: FAMILY MEDICINE
Payer: MEDICARE

## 2024-04-02 VITALS
DIASTOLIC BLOOD PRESSURE: 72 MMHG | SYSTOLIC BLOOD PRESSURE: 110 MMHG | WEIGHT: 236 LBS | HEIGHT: 72 IN | BODY MASS INDEX: 31.97 KG/M2

## 2024-04-02 DIAGNOSIS — Z23 NEED FOR VACCINATION: ICD-10-CM

## 2024-04-02 DIAGNOSIS — E78.2 MIXED HYPERLIPIDEMIA: ICD-10-CM

## 2024-04-02 DIAGNOSIS — E66.09 CLASS 1 OBESITY DUE TO EXCESS CALORIES WITH SERIOUS COMORBIDITY AND BODY MASS INDEX (BMI) OF 32.0 TO 32.9 IN ADULT: ICD-10-CM

## 2024-04-02 DIAGNOSIS — I25.10 CORONARY ARTERY DISEASE INVOLVING NATIVE CORONARY ARTERY OF NATIVE HEART WITHOUT ANGINA PECTORIS: ICD-10-CM

## 2024-04-02 DIAGNOSIS — R20.8 BURNING SENSATION OF FEET: ICD-10-CM

## 2024-04-02 DIAGNOSIS — I10 ESSENTIAL HYPERTENSION: ICD-10-CM

## 2024-04-02 DIAGNOSIS — I77.810 MILD ASCENDING AORTA DILATION (HCC): ICD-10-CM

## 2024-04-02 PROCEDURE — G0009 ADMIN PNEUMOCOCCAL VACCINE: HCPCS | Performed by: PHYSICIAN ASSISTANT

## 2024-04-02 PROCEDURE — 90677 PCV20 VACCINE IM: CPT | Performed by: PHYSICIAN ASSISTANT

## 2024-04-02 PROCEDURE — 3078F DIAST BP <80 MM HG: CPT | Performed by: PHYSICIAN ASSISTANT

## 2024-04-02 PROCEDURE — G0439 PPPS, SUBSEQ VISIT: HCPCS | Mod: 25 | Performed by: PHYSICIAN ASSISTANT

## 2024-04-02 PROCEDURE — 3074F SYST BP LT 130 MM HG: CPT | Performed by: PHYSICIAN ASSISTANT

## 2024-04-02 PROCEDURE — 1126F AMNT PAIN NOTED NONE PRSNT: CPT | Performed by: PHYSICIAN ASSISTANT

## 2024-04-02 SDOH — ECONOMIC STABILITY: INCOME INSECURITY: HOW HARD IS IT FOR YOU TO PAY FOR THE VERY BASICS LIKE FOOD, HOUSING, MEDICAL CARE, AND HEATING?: NOT HARD AT ALL

## 2024-04-02 SDOH — HEALTH STABILITY: PHYSICAL HEALTH: ON AVERAGE, HOW MANY MINUTES DO YOU ENGAGE IN EXERCISE AT THIS LEVEL?: 60 MIN

## 2024-04-02 SDOH — ECONOMIC STABILITY: FOOD INSECURITY: WITHIN THE PAST 12 MONTHS, THE FOOD YOU BOUGHT JUST DIDN'T LAST AND YOU DIDN'T HAVE MONEY TO GET MORE.: NEVER TRUE

## 2024-04-02 SDOH — ECONOMIC STABILITY: FOOD INSECURITY: WITHIN THE PAST 12 MONTHS, YOU WORRIED THAT YOUR FOOD WOULD RUN OUT BEFORE YOU GOT MONEY TO BUY MORE.: NEVER TRUE

## 2024-04-02 SDOH — HEALTH STABILITY: PHYSICAL HEALTH: ON AVERAGE, HOW MANY DAYS PER WEEK DO YOU ENGAGE IN MODERATE TO STRENUOUS EXERCISE (LIKE A BRISK WALK)?: 6 DAYS

## 2024-04-02 SDOH — ECONOMIC STABILITY: INCOME INSECURITY: IN THE LAST 12 MONTHS, WAS THERE A TIME WHEN YOU WERE NOT ABLE TO PAY THE MORTGAGE OR RENT ON TIME?: NO

## 2024-04-02 ASSESSMENT — PAIN SCALES - GENERAL: PAINLEVEL: NO PAIN

## 2024-04-02 ASSESSMENT — FIBROSIS 4 INDEX: FIB4 SCORE: 1.44

## 2024-04-02 ASSESSMENT — PATIENT HEALTH QUESTIONNAIRE - PHQ9: CLINICAL INTERPRETATION OF PHQ2 SCORE: 0

## 2024-04-02 NOTE — ASSESSMENT & PLAN NOTE
Chronic, stable. Finding on 10/2021 echocardiogram with ascending aorta measuring 3.5cm. Pt maintains on losartan 25mg nightly for HTN. Follow up with cardiology at least annually for continued monitoring and management.

## 2024-04-02 NOTE — PROGRESS NOTES
Comprehensive Health Assessment Program     Aldair Winter is a 68 y.o. here for his comprehensive health assessment.    Patient Active Problem List    Diagnosis Date Noted    BMI 32.0-32.9,adult 01/11/2023    IFG (impaired fasting glucose) 01/11/2023    Mild ascending aorta dilation (HCC) 01/11/2023    Nonspecific abnormal function study, cardiovascular 01/11/2023    Burning sensation of feet 01/11/2023    Class 1 obesity due to excess calories with serious comorbidity and body mass index (BMI) of 32.0 to 32.9 in adult 01/11/2023    Essential hypertension 10/28/2020    Mixed hyperlipidemia 10/28/2020    Coronary artery disease involving native coronary artery of native heart 10/28/2020       Current Outpatient Medications   Medication Sig Dispense Refill    losartan (COZAAR) 25 MG Tab Take 1 Tablet by mouth every day for 360 days. 100 Tablet 3    atorvastatin (LIPITOR) 40 MG Tab Take 1 Tablet by mouth every day. 100 Tablet 3    sildenafil citrate (VIAGRA) 50 MG tablet Take 1 Tablet by mouth 1 time a day as needed for Erectile Dysfunction. 30 Tablet 3    aspirin (ADULT ASPIRIN REGIMEN) 81 MG EC tablet Take 1 Tablet by mouth every day. 90 Tablet 2    gabapentin (NEURONTIN) 100 MG Cap Take 1 Capsule by mouth 3 times a day. (Patient not taking: Reported on 4/2/2024) 42 Capsule 0     No current facility-administered medications for this visit.          Current supplements as per medication list.     Allergies:   Patient has no known allergies.  Social History     Tobacco Use    Smoking status: Never    Smokeless tobacco: Never   Vaping Use    Vaping Use: Never used   Substance Use Topics    Alcohol use: Yes     Alcohol/week: 3.6 oz     Types: 6 Standard drinks or equivalent per week    Drug use: Not Currently     Family History   Problem Relation Age of Onset    Depression Mother     Cancer Father         Prostate cancer    No Known Problems Sister     No Known Problems Brother     No Known Problems Sister       Aldair  has a past medical history of Heart attack (HCC) (2019).   History reviewed. No pertinent surgical history.    Screening:  In the last six months have you experienced any leakage of urine? No    Depression Screening  Little interest or pleasure in doing things?  0 - not at all  Feeling down, depressed , or hopeless? 0 - not at all  Patient Health Questionnaire Score: 0     If depressive symptoms identified deferred to follow up visit unless specifically addressed in assessment and plan.    Interpretation of PHQ-9 Total Score   Score Severity   1-4 No Depression   5-9 Mild Depression   10-14 Moderate Depression   15-19 Moderately Severe Depression   20-27 Severe Depression    Screening for Cognitive Impairment  Do you or any of your friends or family members have any concern about your memory? Yes  Three Minute Recall (Leader, Season, Table) 3/3    [place] clock face with all 12 numbers and set the hands to show 10 minutes after 11.  Yes 5  Cognitive concerns identified deferred for follow up unless specifically addressed in assessment and plan.    Fall Risk Assessment  Has the patient had two or more falls in the last year or any fall with injury in the last year?  No    Safety Assessment  Do you always wear your seatbelt?  Yes  Any changes to home needed to function safely? No  Difficulty hearing.  Yes  Patient counseled about all safety risks that were identified.    Functional Assessment ADLs  Are there any barriers preventing you from cooking for yourself or meeting nutritional needs?  No.    Are there any barriers preventing you from driving safely or obtaining transportation?  No.    Are there any barriers preventing you from using a telephone or calling for help?  No    Are there any barriers preventing you from shopping?  No.    Are there any barriers preventing you from taking care of your own finances?  No    Are there any barriers preventing you from managing your medications?  No    Are there any  barriers preventing you from showering, bathing or dressing yourself? No    Are there any barriers preventing you from doing housework or laundry? No  Are there any barriers preventing you from using the toilet?No  Are you currently engaging in any exercise or physical activity?  Yes.  , exercise 5 days/week    Self-Assessment of Health  What is your perception of your health? Good  Do you sleep more than six hours a night? Yes  In the past 7 days, how much did pain keep you from doing your normal work? None  Do you spend quality time with family or friends (virtually or in person)? Yes  Do you usually eat a heart healthy diet that constists of a variety of fruits, vegetables, whole grains and fiber? Yes  Do you eat foods high in fat and/or Fast Food more than three times per week? No    Advance Care Planning  Do you have an Advance Directive, Living Will, Durable Power of , or POLST? Yes          is not on file - instructed patient to bring in a copy to scan into their chart      Health Maintenance Summary            Overdue - COVID-19 Vaccine (5 - 2023-24 season) Overdue since 9/1/2023 12/16/2022  Imm Admin: PFIZER BIVALENT SARS-COV-2 VACCINE (12+)    06/28/2022  Imm Admin: MODERNA SARS-COV-2 VACCINE (12+)    04/15/2021  Imm Admin: PFIZER PURPLE CAP SARS-COV-2 VACCINATION (12+)    03/23/2021  Imm Admin: PFIZER PURPLE CAP SARS-COV-2 VACCINATION (12+)              Influenza Vaccine (Season Ended) Next due on 9/1/2024      10/14/2022  Imm Admin: Influenza Vaccine Adult HD    09/25/2020  Imm Admin: Influenza Vaccine Quad Inj (Pf)    11/12/2018  Imm Admin: Influenza Vaccine Quad Recombinant    11/17/2017  Imm Admin: Influenza (IM) Preservative Free - HISTORICAL DATA              Prostate Specific Antigen (PSA) Screening (Yearly) Next due on 12/22/2024 12/22/2023  Prostatic Specific Antigen Tot component of PROSTATE SPECIFIC AG SCREENING    11/04/2022  Prostatic Specific Antigen Tot component of  PROSTATE SPECIFIC AG SCREENING    03/31/2021  Prostatic Specific Antigen Tot component of PROSTATE SPECIFIC AG SCREENING              Annual Wellness Visit (Yearly) Next due on 4/2/2025 04/02/2024  Level of Service: NM ANNUAL WELLNESS VISIT-INCLUDES PPPS SUBSEQUE*    12/20/2023  Level of Service: NM PREVENTIVE VISIT,EST,65 & OVER    01/11/2023  Level of Service: NM ANNUAL WELLNESS VISIT-INCLUDES PPPS SUBSEQUE*    11/02/2022  Initial Annual Wellness Visit - Includes PPPS ()              Colorectal Cancer Screening (Colonoscopy - Every 10 Years) Tentatively due on 7/13/2031 07/13/2021  REFERRAL TO GI FOR COLONOSCOPY              IMM DTaP/Tdap/Td Vaccine (3 - Td or Tdap) Next due on 1/21/2034 01/21/2024  Imm Admin: Tdap Vaccine    11/17/2017  Imm Admin: Tdap Vaccine              Zoster (Shingles) Vaccines (Series Information) Completed      09/25/2020  Imm Admin: Zoster Vaccine Recombinant (RZV) (SHINGRIX)    07/21/2020  Imm Admin: Zoster Vaccine Recombinant (RZV) (SHINGRIX)              Hepatitis C Screening  Tentatively Complete      11/04/2022  Hepatitis C Antibody component of HEP C VIRUS ANTIBODY              Pneumococcal Vaccine: 65+ Years (Series Information) Completed      04/02/2024  Imm Admin: Pneumococcal Conjugate Vaccine (PCV20)              Hepatitis A Vaccine (Hep A) (Series Information) Aged Out      No completion history exists for this topic.              Hepatitis B Vaccine (Hep B) (Series Information) Aged Out      No completion history exists for this topic.              HPV Vaccines (Series Information) Aged Out      No completion history exists for this topic.              Polio Vaccine (Inactivated Polio) (Series Information) Aged Out      No completion history exists for this topic.              Meningococcal Immunization (Series Information) Aged Out      No completion history exists for this topic.                    Patient Care Team:  Russell Lobo M.D. as PCP  - General (Family Medicine)      Financial Resource Strain: Low Risk  (4/2/2024)    Overall Financial Resource Strain (CARDIA)     Difficulty of Paying Living Expenses: Not hard at all      Transportation Needs: No Transportation Needs (4/2/2024)    PRAPARE - Transportation     Lack of Transportation (Medical): No     Lack of Transportation (Non-Medical): No      Food Insecurity: No Food Insecurity (4/2/2024)    Hunger Vital Sign     Worried About Running Out of Food in the Last Year: Never true     Ran Out of Food in the Last Year: Never true        Encounter Vitals  Blood Pressure : 110/72  Weight: 107 kg (236 lb)  Height: 182.9 cm (6')  BMI (Calculated): 32.01  Pain Score: No pain     Alert, oriented in no acute distress.  Eye contact is good, speech goal directed, affect calm.    Assessment and Plan. The following treatment and monitoring plan is recommended:  Essential hypertension  Chronic, stable. BP today 110/72. Pt does not routinely monitor BP at home. Denies dizziness/lightheadedness, chest pain, dyspnea. Continue current treatment regime: losartan 25mg daily. Follow up with PCP annually for continued monitoring and management.     Mixed hyperlipidemia  Chronic, stable. Associated with CAD. Maintains on statin therapy without issue. Most recent lipid panel from 12/2023 WNL. Continue atorvastatin 40mg daily. Recommend Mediterranean diet and regular physical activity. Follow up with PCP at least annually for continued monitoring and management.   Lab Results   Component Value Date/Time    CHOLSTRLTOT 105 12/22/2023 07:38 AM    LDL 48 12/22/2023 07:38 AM    HDL 42 12/22/2023 07:38 AM    TRIGLYCERIDE 73 12/22/2023 07:38 AM       Mild ascending aorta dilation (HCC)  Chronic, stable. Finding on 10/2021 echocardiogram with ascending aorta measuring 3.5cm. Pt maintains on losartan 25mg nightly for HTN. Follow up with cardiology at least annually for continued monitoring and management.    Coronary artery disease  involving native coronary artery of native heart  Chronic, stable. Pt has a hx of MI with subsequent s/p stent placement in 2019. He maintains on atorvastatin 40mg daily and ASA 81mg daily in addition to losartan 25mg daily for HTN. He denies CP, dyspnea. He does note that he has measured his heart rate to be as high as 200 with exercise as of late and questions if this is normal. He denies decreasing exercise tolerance. He has not seen cardiology since 2021. Advise follow up with cardiology per routine for continued monitoring and management.    Class 1 obesity due to excess calories with serious comorbidity and body mass index (BMI) of 32.0 to 32.9 in adult  Chronic, stable. BMI today 32.01. Associated with HTN, HLD, and CAD. He reports he exercises 5 days/week and adheres to a healthy diet. Follow up with PCP at least annually for continued monitoring and management.    Burning sensation of feet  Chronic, ongoing. Pt reports intermittent burning sensation and numbness in his bilateral feet. He has undergone EMG testing which he reports was normal. Symptoms are being attributed to foot arthritis. He attempted gabapentin which he did not find beneficial. Follow up with [facility] for further evaluation and management.    Services suggested: No services needed at this time  Health Care Screening: Age-appropriate preventive services recommended by USPTF and ACIP covered by Medicare were discussed today. Services ordered if indicated and agreed upon by the patient.  Referrals offered: Community-based lifestyle interventions to reduce health risks and promote self-management and wellness, fall prevention, nutrition, physical activity, tobacco-use cessation, weight loss, and mental health services as per orders if indicated.    Discussion today about general wellness and lifestyle habits:    Prevent falls and reduce trip hazards; Cautioned about securing or removing rugs.  Have a working fire alarm and carbon monoxide  detector.  Engage in regular physical activity and social activities.    Follow-up: Return for follow up visit with PCP as previously scheduled.

## 2024-04-02 NOTE — ASSESSMENT & PLAN NOTE
Chronic, stable. BP today 110/72. Pt does not routinely monitor BP at home. Denies dizziness/lightheadedness, chest pain, dyspnea. Continue current treatment regime: losartan 25mg daily. Follow up with PCP annually for continued monitoring and management.

## 2024-04-02 NOTE — ASSESSMENT & PLAN NOTE
Chronic, stable. BMI today 32.01. Associated with HTN, HLD, and CAD. He reports he exercises 5 days/week and adheres to a healthy diet. Follow up with PCP at least annually for continued monitoring and management.

## 2024-04-02 NOTE — ASSESSMENT & PLAN NOTE
Chronic, stable. Pt has a hx of MI with subsequent s/p stent placement in 2019. He maintains on atorvastatin 40mg daily and ASA 81mg daily in addition to losartan 25mg daily for HTN. He denies CP, dyspnea. He does note that he has measured his heart rate to be as high as 200 with exercise as of late and questions if this is normal. He denies decreasing exercise tolerance. He has not seen cardiology since 2021. Advise follow up with cardiology per routine for continued monitoring and management.

## 2024-04-02 NOTE — ASSESSMENT & PLAN NOTE
Chronic, ongoing. Pt reports intermittent burning sensation and numbness in his bilateral feet. He has undergone EMG testing which he reports was normal. Symptoms are being attributed to foot arthritis. He attempted gabapentin which he did not find beneficial. Follow up with [facility] for further evaluation and management.

## 2024-04-02 NOTE — ASSESSMENT & PLAN NOTE
Chronic, stable. Associated with CAD. Maintains on statin therapy without issue. Most recent lipid panel from 12/2023 WNL. Continue atorvastatin 40mg daily. Recommend Mediterranean diet and regular physical activity. Follow up with PCP at least annually for continued monitoring and management.   Lab Results   Component Value Date/Time    CHOLSTRLTOT 105 12/22/2023 07:38 AM    LDL 48 12/22/2023 07:38 AM    HDL 42 12/22/2023 07:38 AM    TRIGLYCERIDE 73 12/22/2023 07:38 AM

## 2024-04-22 ENCOUNTER — PATIENT MESSAGE (OUTPATIENT)
Dept: CARDIOLOGY | Facility: MEDICAL CENTER | Age: 69
End: 2024-04-22
Payer: MEDICARE

## 2024-05-13 DIAGNOSIS — I25.10 CORONARY ARTERY DISEASE INVOLVING NATIVE CORONARY ARTERY OF NATIVE HEART WITHOUT ANGINA PECTORIS: ICD-10-CM

## 2024-05-13 PROCEDURE — RXMED WILLOW AMBULATORY MEDICATION CHARGE: Performed by: FAMILY MEDICINE

## 2024-05-13 NOTE — TELEPHONE ENCOUNTER
Received request via: Pharmacy    Was the patient seen in the last year in this department? Yes    Does the patient have an active prescription (recently filled or refills available) for medication(s) requested? No    [facility]: [facility]     Does the patient have alf Plus and need 100 day supply (blood pressure, diabetes and cholesterol meds only)? Medication is not for cholesterol, blood pressure or diabetes    aspirin (ASPIRIN LOW DOSE) 81 MG EC tablet

## 2024-05-14 RX ORDER — ASPIRIN 81 MG/1
81 TABLET ORAL DAILY
Qty: 90 TABLET | Refills: 3 | Status: SHIPPED | OUTPATIENT
Start: 2024-05-14 | End: 2025-05-09

## 2024-05-15 ENCOUNTER — PHARMACY VISIT (OUTPATIENT)
Dept: PHARMACY | Facility: MEDICAL CENTER | Age: 69
End: 2024-05-15
Payer: COMMERCIAL

## 2024-06-25 ENCOUNTER — OFFICE VISIT (OUTPATIENT)
Dept: URGENT CARE | Facility: CLINIC | Age: 69
End: 2024-06-25
Payer: MEDICARE

## 2024-06-25 VITALS
TEMPERATURE: 98 F | BODY MASS INDEX: 31.83 KG/M2 | OXYGEN SATURATION: 96 % | RESPIRATION RATE: 19 BRPM | HEIGHT: 72 IN | HEART RATE: 58 BPM | SYSTOLIC BLOOD PRESSURE: 124 MMHG | WEIGHT: 235 LBS | DIASTOLIC BLOOD PRESSURE: 80 MMHG

## 2024-06-25 DIAGNOSIS — R05.1 ACUTE COUGH: ICD-10-CM

## 2024-06-25 DIAGNOSIS — R06.2 WHEEZING: ICD-10-CM

## 2024-06-25 PROCEDURE — 3074F SYST BP LT 130 MM HG: CPT | Performed by: REGISTERED NURSE

## 2024-06-25 PROCEDURE — 99214 OFFICE O/P EST MOD 30 MIN: CPT | Performed by: REGISTERED NURSE

## 2024-06-25 PROCEDURE — 3079F DIAST BP 80-89 MM HG: CPT | Performed by: REGISTERED NURSE

## 2024-06-25 RX ORDER — DEXTROMETHORPHAN HYDROBROMIDE AND PROMETHAZINE HYDROCHLORIDE 15; 6.25 MG/5ML; MG/5ML
5 SYRUP ORAL EVERY 4 HOURS PRN
Qty: 120 ML | Refills: 0 | Status: SHIPPED | OUTPATIENT
Start: 2024-06-25

## 2024-06-25 RX ORDER — AMOXICILLIN 875 MG/1
875 TABLET, COATED ORAL 2 TIMES DAILY
Qty: 14 TABLET | Refills: 0 | Status: SHIPPED | OUTPATIENT
Start: 2024-06-25 | End: 2024-07-02

## 2024-06-25 RX ORDER — ALBUTEROL SULFATE 90 UG/1
2 AEROSOL, METERED RESPIRATORY (INHALATION) EVERY 6 HOURS PRN
Qty: 8.5 G | Refills: 0 | Status: SHIPPED | OUTPATIENT
Start: 2024-06-25

## 2024-06-25 ASSESSMENT — ENCOUNTER SYMPTOMS
FEVER: 0
DIZZINESS: 0
CHILLS: 0
SHORTNESS OF BREATH: 0
ABDOMINAL PAIN: 0

## 2024-06-25 ASSESSMENT — FIBROSIS 4 INDEX: FIB4 SCORE: 1.46

## 2024-06-25 NOTE — PROGRESS NOTES
Subjective:   Aldair Winter is a 69 y.o. male who presents for Cough (3 days ) and Pharyngitis (3 days )    HPI  4-5 days of worsening cough, coughing fits, the coughing is causing a headache. Initially had fever, body aches chills. Hx of MI in 2019, no chronic lung disease. Has tried some benzonatate, and albuterol which did help. Tolerating PO. Immunizations are current. No known exposures.    Review of Systems   Constitutional:  Negative for chills and fever.   Respiratory:  Negative for shortness of breath.    Cardiovascular:  Negative for chest pain.   Gastrointestinal:  Negative for abdominal pain.   Skin:  Negative for rash.   Neurological:  Negative for dizziness.       No Known Allergies    Patient Active Problem List    Diagnosis Date Noted    BMI 32.0-32.9,adult 01/11/2023    IFG (impaired fasting glucose) 01/11/2023    Mild ascending aorta dilation (HCC) 01/11/2023    Nonspecific abnormal function study, cardiovascular 01/11/2023    Burning sensation of feet 01/11/2023    Class 1 obesity due to excess calories with serious comorbidity and body mass index (BMI) of 32.0 to 32.9 in adult 01/11/2023    Essential hypertension 10/28/2020    Mixed hyperlipidemia 10/28/2020    Coronary artery disease involving native coronary artery of native heart 10/28/2020       Current Outpatient Medications Ordered in Epic   Medication Sig Dispense Refill    promethazine-dextromethorphan (PROMETHAZINE-DM) 6.25-15 MG/5ML syrup Take 5 mL by mouth every four hours as needed for Cough. 120 mL 0    amoxicillin (AMOXIL) 875 MG tablet Take 1 Tablet by mouth 2 times a day for 7 days. 14 Tablet 0    albuterol 108 (90 Base) MCG/ACT Aero Soln inhalation aerosol Inhale 2 Puffs every 6 hours as needed for Shortness of Breath. 8.5 g 0    aspirin (ASPIRIN LOW DOSE) 81 MG EC tablet Take 1 Tablet by mouth every day for 360 days. 90 Tablet 3    losartan (COZAAR) 25 MG Tab Take 1 Tablet by mouth every day for 360 days. 100 Tablet 3     atorvastatin (LIPITOR) 40 MG Tab Take 1 Tablet by mouth every day. 100 Tablet 3    sildenafil citrate (VIAGRA) 50 MG tablet Take 1 Tablet by mouth 1 time a day as needed for Erectile Dysfunction. 30 Tablet 3    gabapentin (NEURONTIN) 100 MG Cap Take 1 Capsule by mouth 3 times a day. 42 Capsule 0     No current Epic-ordered facility-administered medications on file.       No past surgical history on file.    Social History     Tobacco Use    Smoking status: Never    Smokeless tobacco: Never   Vaping Use    Vaping status: Never Used   Substance Use Topics    Alcohol use: Yes     Alcohol/week: 3.6 oz     Types: 6 Standard drinks or equivalent per week    Drug use: Not Currently       family history includes Cancer in his father; Depression in his mother; No Known Problems in his brother, sister, and sister.     Problem list, medications, and allergies reviewed by myself today in Epic.     Objective:   /80   Pulse (!) 58   Temp 36.7 °C (98 °F) (Temporal)   Resp 19   Ht 1.829 m (6')   Wt 107 kg (235 lb)   SpO2 96%   BMI 31.87 kg/m²     Physical Exam  Vitals and nursing note reviewed.   Constitutional:       Appearance: Normal appearance. He is not ill-appearing or toxic-appearing.   HENT:      Right Ear: Tympanic membrane, ear canal and external ear normal.      Left Ear: Tympanic membrane, ear canal and external ear normal.      Nose: Congestion present.      Mouth/Throat:      Mouth: Mucous membranes are moist.      Pharynx: No oropharyngeal exudate or posterior oropharyngeal erythema.   Eyes:      Pupils: Pupils are equal, round, and reactive to light.   Cardiovascular:      Rate and Rhythm: Normal rate and regular rhythm.   Pulmonary:      Effort: Pulmonary effort is normal. No respiratory distress.      Breath sounds: Normal breath sounds.   Musculoskeletal:         General: Normal range of motion.      Cervical back: Normal range of motion. No rigidity.   Lymphadenopathy:      Cervical: No cervical  adenopathy.   Skin:     General: Skin is warm and dry.      Capillary Refill: Capillary refill takes less than 2 seconds.      Findings: No rash.   Neurological:      General: No focal deficit present.      Mental Status: He is alert and oriented to person, place, and time.      Cranial Nerves: No cranial nerve deficit.   Psychiatric:         Mood and Affect: Mood normal.         Assessment/Plan:     I personally reviewed prior external notes and test results pertinent to today's visit as well as additional imaging and testing completed in clinic today. Shared decision-making was utilized with patient for treatment plan.     1. Acute cough  promethazine-dextromethorphan (PROMETHAZINE-DM) 6.25-15 MG/5ML syrup    amoxicillin (AMOXIL) 875 MG tablet      2. Wheezing  albuterol 108 (90 Base) MCG/ACT Aero Soln inhalation aerosol        Pleasant 69-year-old male presenting with 4 days of worsening cough that is now productive.  Does have history of MI but no chronic lung disease.  No recent pneumonia.  No recent hospitalizations.  He did use an albuterol inhaler which did seem to improve his cough and breathing.  Also using benzonatate.  Cough seems worse at night improved with sitting up.  Vitals are reassuring, good oxygenation no tachycardia, afebrile.  Does appear well and nontoxic, slight congestion, did not appreciate any adventitious lung sounds.  Remainder of exam unremarkable.  Given the improvement with albuterol I would suspect some bronchoconstriction.  Will provide additional cough support.  Discussed the risks of Promethazine DM.  At this time likely still viral but will send contingent amoxicillin in the event develops secondary bacterial lung infection symptoms which we reviewed at length.  He verbalized understanding.  Discussed pulmonary toilet.  Increase fluids.    Medication discussed included indication for use and the potential benefits and side effects. Education was provided regarding the  aforementioned assessments. All of the patient's questions were answered to their satisfaction at the time of discharge. Patient was encouraged to monitor symptoms closely and we reviewed the signs and symptoms which would warrant concern and mandate seeking a higher level of service through the emergency department. Patient stated agreement and understanding of this plan of care.     Please note that this dictation was created using voice recognition software. I have made every reasonable attempt to correct obvious errors, but I expect that there are errors of grammar and possibly content that I did not discover before finalizing the note.    This note was electronically signed by SANDY Varghese

## 2024-08-27 PROCEDURE — RXMED WILLOW AMBULATORY MEDICATION CHARGE: Performed by: FAMILY MEDICINE

## 2024-08-28 ENCOUNTER — PHARMACY VISIT (OUTPATIENT)
Dept: PHARMACY | Facility: MEDICAL CENTER | Age: 69
End: 2024-08-28
Payer: COMMERCIAL

## 2024-09-19 ENCOUNTER — OFFICE VISIT (OUTPATIENT)
Dept: MEDICAL GROUP | Facility: LAB | Age: 69
End: 2024-09-19
Payer: MEDICARE

## 2024-09-19 VITALS
DIASTOLIC BLOOD PRESSURE: 66 MMHG | WEIGHT: 225 LBS | HEIGHT: 74 IN | BODY MASS INDEX: 28.88 KG/M2 | TEMPERATURE: 97.6 F | OXYGEN SATURATION: 96 % | RESPIRATION RATE: 16 BRPM | SYSTOLIC BLOOD PRESSURE: 104 MMHG | HEART RATE: 60 BPM

## 2024-09-19 DIAGNOSIS — R35.1 BENIGN PROSTATIC HYPERPLASIA WITH NOCTURIA: ICD-10-CM

## 2024-09-19 DIAGNOSIS — F10.10 ALCOHOL USE DISORDER, MILD, ABUSE: ICD-10-CM

## 2024-09-19 DIAGNOSIS — N40.1 BENIGN PROSTATIC HYPERPLASIA WITH NOCTURIA: ICD-10-CM

## 2024-09-19 PROCEDURE — 3074F SYST BP LT 130 MM HG: CPT | Performed by: FAMILY MEDICINE

## 2024-09-19 PROCEDURE — 3078F DIAST BP <80 MM HG: CPT | Performed by: FAMILY MEDICINE

## 2024-09-19 PROCEDURE — 99214 OFFICE O/P EST MOD 30 MIN: CPT | Performed by: FAMILY MEDICINE

## 2024-09-19 RX ORDER — TAMSULOSIN HYDROCHLORIDE 0.4 MG/1
0.4 CAPSULE ORAL
Qty: 90 CAPSULE | Refills: 3 | Status: SHIPPED | OUTPATIENT
Start: 2024-09-19

## 2024-09-19 ASSESSMENT — FIBROSIS 4 INDEX: FIB4 SCORE: 1.46

## 2024-09-19 NOTE — PROGRESS NOTES
Verbal consent was acquired by the patient to use [employer] ambient listening note generation during this visit Yes    Subjective:   Aldair Winter is a 69 y.o. male here today for   No chief complaint on file.    History of Present Illness  The patient is a 69-year-old male here today to discuss alcohol use. He is accompanied by his wife.    He reports experiencing marital issues, which he attributes in part to his alcohol consumption. He does not identify as an alcoholic but acknowledges occasional excessive drinking. His alcohol intake varies, with some weeks involving no alcohol and others including up to 15 drinks. He typically drinks beer during social events on Friday and Saturday nights, averaging 3 to 4 drinks per night. He does not endorse binge drinking or experiencing hangovers. His wife, however, expresses concern about his behavior when intoxicated and suspects him of hiding alcohol around the house. They have been attending therapy since 2024, during which he admitted to dishonesty and secrecy. They are currently . He has sought help for his alcohol use from a therapist who specializes in substance abuse. He reports no history of withdrawal symptoms.    He also requests blood work to monitor his Prostate-Specific Antigen (PSA) levels, as he experiences frequent urination at night and wishes to explore medication options to alleviate this symptom.    FAMILY HISTORY  His father  of prostate cancer.      No Known Allergies      Current medicines (including changes today)  Current Outpatient Medications   Medication Sig Dispense Refill    promethazine-dextromethorphan (PROMETHAZINE-DM) 6.25-15 MG/5ML syrup Take 5 mL by mouth every four hours as needed for Cough. 120 mL 0    albuterol 108 (90 Base) MCG/ACT Aero Soln inhalation aerosol Inhale 2 Puffs every 6 hours as needed for Shortness of Breath. 8.5 g 0    aspirin (ASPIRIN LOW DOSE) 81 MG EC tablet Take 1 Tablet by mouth every  "day for 360 days. 90 Tablet 3    losartan (COZAAR) 25 MG Tab Take 1 Tablet by mouth every day for 360 days. 100 Tablet 3    atorvastatin (LIPITOR) 40 MG Tab Take 1 Tablet by mouth every day. 100 Tablet 3    sildenafil citrate (VIAGRA) 50 MG tablet Take 1 Tablet by mouth 1 time a day as needed for Erectile Dysfunction. 30 Tablet 3    gabapentin (NEURONTIN) 100 MG Cap Take 1 Capsule by mouth 3 times a day. 42 Capsule 0     No current facility-administered medications for this visit.     He  has a past medical history of Heart attack (HCC) (2019).    ROS   ROS  -See HPI     Objective:     Physical Exam:  /66   Pulse 60   Temp 36.4 °C (97.6 °F) (Temporal)   Resp 16   Ht 1.88 m (6' 2.02\")   Wt 102 kg (225 lb)   SpO2 96%  Body mass index is 28.88 kg/m².   Constitutional: Alert, no distress, well-groomed.  Skin: No rashes in visible areas.  Eye: Round. Conjunctiva clear, lids normal. No icterus.   ENMT: Lips pink without lesions, good dentition, moist mucous membranes. Phonation normal.  Neck: No masses, no thyromegaly. Moves freely without pain.  Respiratory: Unlabored respiratory effort, no cough or audible wheeze  Psych: Alert and oriented x3, normal affect and mood.       Results      Assessment and Plan:     Assessment & Plan  1. Alcohol Use Disorder.  The patient's history and symptoms align with the diagnostic criteria for alcohol use disorder. He reports drinking less than 10 drinks on a normal week but up to 12-15 drinks on a busy week, primarily in the evenings and during social events. He has experienced marital issues and a near DUI incident related to his alcohol use. Counseling was recommended as a beneficial approach. A referral to a specialist in alcohol use disorder will be arranged. Support groups such as AA were discussed, and he was encouraged to try different meetings to find a suitable group.    2. Benign Prostatic Hyperplasia.  He reports frequent nighttime urination. Tamsulosin 0.4 mg " once daily in the morning was prescribed to reduce urinary symptoms. The potential side effects, including dizziness and lightheadedness due to blood pressure changes, were discussed. Blood work, including blood glucose levels, will be conducted. PSA testing will be deferred until December as per Medicare guidelines.    Follow-up  Return in 1 to 2 months for follow up.    Orders:  1. Alcohol use disorder, mild, abuse  - Referral to Behavioral Health  - CBC WITHOUT DIFFERENTIAL; Future  - Comp Metabolic Panel; Future  - TSH WITH REFLEX TO FT4; Future    2. Benign prostatic hyperplasia with nocturia  - tamsulosin (FLOMAX) 0.4 MG capsule; Take 1 Capsule by mouth 1/2 hour after breakfast.  Dispense: 90 Capsule; Refill: 3        Followup: No follow-ups on file.         PLEASE NOTE: This dictation was created using voice recognition and [employer] ambient listening software. I have made every reasonable attempt to correct obvious errors, but I expect that there are errors of grammar and possibly content that I did not discover before finalizing the note.

## 2024-09-30 ENCOUNTER — HOSPITAL ENCOUNTER (OUTPATIENT)
Dept: LAB | Facility: MEDICAL CENTER | Age: 69
End: 2024-09-30
Attending: FAMILY MEDICINE
Payer: MEDICARE

## 2024-09-30 DIAGNOSIS — F10.10 ALCOHOL USE DISORDER, MILD, ABUSE: ICD-10-CM

## 2024-09-30 LAB
ALBUMIN SERPL BCP-MCNC: 4.3 G/DL (ref 3.2–4.9)
ALBUMIN/GLOB SERPL: 2 G/DL
ALP SERPL-CCNC: 53 U/L (ref 30–99)
ALT SERPL-CCNC: 34 U/L (ref 2–50)
ANION GAP SERPL CALC-SCNC: 9 MMOL/L (ref 7–16)
AST SERPL-CCNC: 27 U/L (ref 12–45)
BILIRUB SERPL-MCNC: 1.3 MG/DL (ref 0.1–1.5)
BUN SERPL-MCNC: 17 MG/DL (ref 8–22)
CALCIUM ALBUM COR SERPL-MCNC: 9.5 MG/DL (ref 8.5–10.5)
CALCIUM SERPL-MCNC: 9.7 MG/DL (ref 8.5–10.5)
CHLORIDE SERPL-SCNC: 105 MMOL/L (ref 96–112)
CO2 SERPL-SCNC: 26 MMOL/L (ref 20–33)
CREAT SERPL-MCNC: 1.1 MG/DL (ref 0.5–1.4)
ERYTHROCYTE [DISTWIDTH] IN BLOOD BY AUTOMATED COUNT: 47.3 FL (ref 35.9–50)
GFR SERPLBLD CREATININE-BSD FMLA CKD-EPI: 73 ML/MIN/1.73 M 2
GLOBULIN SER CALC-MCNC: 2.1 G/DL (ref 1.9–3.5)
GLUCOSE SERPL-MCNC: 104 MG/DL (ref 65–99)
HCT VFR BLD AUTO: 46.5 % (ref 42–52)
HGB BLD-MCNC: 16.2 G/DL (ref 14–18)
MCH RBC QN AUTO: 36 PG (ref 27–33)
MCHC RBC AUTO-ENTMCNC: 34.8 G/DL (ref 32.3–36.5)
MCV RBC AUTO: 103.3 FL (ref 81.4–97.8)
PLATELET # BLD AUTO: 187 K/UL (ref 164–446)
PMV BLD AUTO: 10.2 FL (ref 9–12.9)
POTASSIUM SERPL-SCNC: 4.1 MMOL/L (ref 3.6–5.5)
PROT SERPL-MCNC: 6.4 G/DL (ref 6–8.2)
RBC # BLD AUTO: 4.5 M/UL (ref 4.7–6.1)
SODIUM SERPL-SCNC: 140 MMOL/L (ref 135–145)
TSH SERPL DL<=0.005 MIU/L-ACNC: 1.49 UIU/ML (ref 0.38–5.33)
WBC # BLD AUTO: 6.3 K/UL (ref 4.8–10.8)

## 2024-09-30 PROCEDURE — 84443 ASSAY THYROID STIM HORMONE: CPT

## 2024-09-30 PROCEDURE — 36415 COLL VENOUS BLD VENIPUNCTURE: CPT

## 2024-09-30 PROCEDURE — 85027 COMPLETE CBC AUTOMATED: CPT

## 2024-09-30 PROCEDURE — 80053 COMPREHEN METABOLIC PANEL: CPT

## 2024-10-16 ENCOUNTER — APPOINTMENT (OUTPATIENT)
Dept: BEHAVIORAL HEALTH | Facility: CLINIC | Age: 69
End: 2024-10-16
Payer: MEDICARE

## 2024-10-16 DIAGNOSIS — F43.23 ADJUSTMENT DISORDER WITH MIXED ANXIETY AND DEPRESSED MOOD: ICD-10-CM

## 2024-10-16 PROCEDURE — 90791 PSYCH DIAGNOSTIC EVALUATION: CPT | Performed by: COUNSELOR

## 2024-10-16 ASSESSMENT — PATIENT HEALTH QUESTIONNAIRE - PHQ9
5. POOR APPETITE OR OVEREATING: 0 - NOT AT ALL
CLINICAL INTERPRETATION OF PHQ2 SCORE: 1
SUM OF ALL RESPONSES TO PHQ QUESTIONS 1-9: 4

## 2024-10-16 ASSESSMENT — ANXIETY QUESTIONNAIRES
1. FEELING NERVOUS, ANXIOUS, OR ON EDGE: SEVERAL DAYS
3. WORRYING TOO MUCH ABOUT DIFFERENT THINGS: SEVERAL DAYS
5. BEING SO RESTLESS THAT IT IS HARD TO SIT STILL: NOT AT ALL
6. BECOMING EASILY ANNOYED OR IRRITABLE: NOT AT ALL
2. NOT BEING ABLE TO STOP OR CONTROL WORRYING: SEVERAL DAYS
7. FEELING AFRAID AS IF SOMETHING AWFUL MIGHT HAPPEN: NOT AT ALL
GAD7 TOTAL SCORE: 3
4. TROUBLE RELAXING: NOT AT ALL

## 2024-12-04 ENCOUNTER — PHARMACY VISIT (OUTPATIENT)
Dept: PHARMACY | Facility: MEDICAL CENTER | Age: 69
End: 2024-12-04
Payer: COMMERCIAL

## 2024-12-04 PROCEDURE — RXMED WILLOW AMBULATORY MEDICATION CHARGE: Performed by: FAMILY MEDICINE

## 2025-03-05 ENCOUNTER — PATIENT MESSAGE (OUTPATIENT)
Dept: MEDICAL GROUP | Facility: LAB | Age: 70
End: 2025-03-05
Payer: MEDICARE

## 2025-03-05 DIAGNOSIS — F10.10 ALCOHOL USE DISORDER, MILD, ABUSE: ICD-10-CM

## 2025-03-05 DIAGNOSIS — R71.8 ELEVATED MCV: ICD-10-CM

## 2025-03-11 ENCOUNTER — HOSPITAL ENCOUNTER (OUTPATIENT)
Dept: LAB | Facility: MEDICAL CENTER | Age: 70
End: 2025-03-11
Attending: FAMILY MEDICINE
Payer: MEDICARE

## 2025-03-11 DIAGNOSIS — F10.10 ALCOHOL USE DISORDER, MILD, ABUSE: ICD-10-CM

## 2025-03-11 DIAGNOSIS — R71.8 ELEVATED MCV: ICD-10-CM

## 2025-03-11 LAB
ALBUMIN SERPL BCP-MCNC: 4.3 G/DL (ref 3.2–4.9)
ALBUMIN/GLOB SERPL: 1.9 G/DL
ALP SERPL-CCNC: 57 U/L (ref 30–99)
ALT SERPL-CCNC: 38 U/L (ref 2–50)
ANION GAP SERPL CALC-SCNC: 11 MMOL/L (ref 7–16)
AST SERPL-CCNC: 29 U/L (ref 12–45)
BILIRUB SERPL-MCNC: 1.1 MG/DL (ref 0.1–1.5)
BUN SERPL-MCNC: 18 MG/DL (ref 8–22)
CALCIUM ALBUM COR SERPL-MCNC: 9.3 MG/DL (ref 8.5–10.5)
CALCIUM SERPL-MCNC: 9.5 MG/DL (ref 8.5–10.5)
CHLORIDE SERPL-SCNC: 107 MMOL/L (ref 96–112)
CO2 SERPL-SCNC: 23 MMOL/L (ref 20–33)
CREAT SERPL-MCNC: 1.02 MG/DL (ref 0.5–1.4)
ERYTHROCYTE [DISTWIDTH] IN BLOOD BY AUTOMATED COUNT: 44.7 FL (ref 35.9–50)
GFR SERPLBLD CREATININE-BSD FMLA CKD-EPI: 80 ML/MIN/1.73 M 2
GLOBULIN SER CALC-MCNC: 2.3 G/DL (ref 1.9–3.5)
GLUCOSE SERPL-MCNC: 114 MG/DL (ref 65–99)
HCT VFR BLD AUTO: 44.5 % (ref 42–52)
HGB BLD-MCNC: 15.7 G/DL (ref 14–18)
MCH RBC QN AUTO: 35.4 PG (ref 27–33)
MCHC RBC AUTO-ENTMCNC: 35.3 G/DL (ref 32.3–36.5)
MCV RBC AUTO: 100.2 FL (ref 81.4–97.8)
PLATELET # BLD AUTO: 173 K/UL (ref 164–446)
PMV BLD AUTO: 9.9 FL (ref 9–12.9)
POTASSIUM SERPL-SCNC: 4.1 MMOL/L (ref 3.6–5.5)
PROT SERPL-MCNC: 6.6 G/DL (ref 6–8.2)
RBC # BLD AUTO: 4.44 M/UL (ref 4.7–6.1)
SODIUM SERPL-SCNC: 141 MMOL/L (ref 135–145)
WBC # BLD AUTO: 6.2 K/UL (ref 4.8–10.8)

## 2025-03-11 PROCEDURE — 36415 COLL VENOUS BLD VENIPUNCTURE: CPT

## 2025-03-11 PROCEDURE — 85027 COMPLETE CBC AUTOMATED: CPT

## 2025-03-11 PROCEDURE — 80053 COMPREHEN METABOLIC PANEL: CPT

## 2025-03-12 ENCOUNTER — RESULTS FOLLOW-UP (OUTPATIENT)
Dept: MEDICAL GROUP | Facility: LAB | Age: 70
End: 2025-03-12
Payer: MEDICARE

## 2025-03-13 ENCOUNTER — OFFICE VISIT (OUTPATIENT)
Dept: MEDICAL GROUP | Facility: LAB | Age: 70
End: 2025-03-13
Payer: MEDICARE

## 2025-03-13 VITALS
WEIGHT: 230 LBS | RESPIRATION RATE: 14 BRPM | TEMPERATURE: 98.2 F | BODY MASS INDEX: 29.52 KG/M2 | HEART RATE: 77 BPM | HEIGHT: 74 IN | DIASTOLIC BLOOD PRESSURE: 68 MMHG | SYSTOLIC BLOOD PRESSURE: 92 MMHG | OXYGEN SATURATION: 97 %

## 2025-03-13 DIAGNOSIS — D75.89 MACROCYTOSIS: ICD-10-CM

## 2025-03-13 DIAGNOSIS — Z12.5 ENCOUNTER FOR SCREENING FOR MALIGNANT NEOPLASM OF PROSTATE: ICD-10-CM

## 2025-03-13 DIAGNOSIS — N52.9 ERECTILE DYSFUNCTION, UNSPECIFIED ERECTILE DYSFUNCTION TYPE: ICD-10-CM

## 2025-03-13 DIAGNOSIS — R73.9 BLOOD GLUCOSE ELEVATED: ICD-10-CM

## 2025-03-13 LAB
HBA1C MFR BLD: 5.2 % (ref ?–5.8)
POCT INT CON NEG: NEGATIVE
POCT INT CON POS: POSITIVE

## 2025-03-13 PROCEDURE — 3074F SYST BP LT 130 MM HG: CPT | Performed by: FAMILY MEDICINE

## 2025-03-13 PROCEDURE — 99214 OFFICE O/P EST MOD 30 MIN: CPT | Performed by: FAMILY MEDICINE

## 2025-03-13 PROCEDURE — 83036 HEMOGLOBIN GLYCOSYLATED A1C: CPT | Performed by: FAMILY MEDICINE

## 2025-03-13 PROCEDURE — 3078F DIAST BP <80 MM HG: CPT | Performed by: FAMILY MEDICINE

## 2025-03-13 RX ORDER — SILDENAFIL 100 MG/1
100 TABLET, FILM COATED ORAL
Qty: 30 TABLET | Refills: 3 | Status: SHIPPED | OUTPATIENT
Start: 2025-03-13

## 2025-03-13 ASSESSMENT — FIBROSIS 4 INDEX: FIB4 SCORE: 1.82

## 2025-03-13 ASSESSMENT — PATIENT HEALTH QUESTIONNAIRE - PHQ9: CLINICAL INTERPRETATION OF PHQ2 SCORE: 0

## 2025-03-14 NOTE — PROGRESS NOTES
Verbal consent was acquired by the patient to use [employer] ambient listening note generation during this visit Yes    Subjective:   Aldair Winter is a 69 y.o. male here today for   Chief Complaint   Patient presents with    Lab Results     History of Present Illness  The patient is a 69-year-old male who presents today to follow up on abnormal lab work.    He has a history of elevated blood glucose levels. He maintains an active lifestyle, including regular exercise, but acknowledges a diet high in carbohydrates, including chips, crackers, and ice cream. He admits to frequent snacking throughout the day.    He has been abstaining from alcohol for the past 6 months and reports feeling well overall. Despite his efforts, he has not observed any changes in his MCV levels. He expresses concern about the potential impact of his condition on his kidney and liver function. He has been on a daily regimen of vitamin B12 since 09/2024.    He requests a refill of his sildenafil prescription, as he only has a few doses remaining. He reports no adverse effects from the medication and inquires about the possibility of increasing the dosage.  Patient states that erectile dysfunction is only slightly responding to lower dose of sildenafil    SOCIAL HISTORY  The patient stopped drinking alcohol in September and has been abstinent for 6 months.    MEDICATIONS  Current: Vitamin B12, sildenafil      No Known Allergies      Current medicines (including changes today)  Current Outpatient Medications   Medication Sig Dispense Refill    sildenafil citrate (VIAGRA) 100 MG tablet Take 1 Tablet by mouth 1 time a day as needed for Erectile Dysfunction. 30 Tablet 3    tamsulosin (FLOMAX) 0.4 MG capsule Take 1 Capsule by mouth 1/2 hour after breakfast. 90 Capsule 3    promethazine-dextromethorphan (PROMETHAZINE-DM) 6.25-15 MG/5ML syrup Take 5 mL by mouth every four hours as needed for Cough. 120 mL 0    albuterol 108 (90 Base) MCG/ACT Aero  "Soln inhalation aerosol Inhale 2 Puffs every 6 hours as needed for Shortness of Breath. 8.5 g 0    aspirin (ASPIRIN LOW DOSE) 81 MG EC tablet Take 1 Tablet by mouth every day for 360 days. 90 Tablet 3    losartan (COZAAR) 25 MG Tab Take 1 Tablet by mouth every day for 360 days. 100 Tablet 3    atorvastatin (LIPITOR) 40 MG Tab Take 1 Tablet by mouth every day. 100 Tablet 3    gabapentin (NEURONTIN) 100 MG Cap Take 1 Capsule by mouth 3 times a day. 42 Capsule 0     No current facility-administered medications for this visit.     He  has a past medical history of Heart attack (HCC) (2019).    ROS   ROS  -See HPI     Objective:     Physical Exam:  BP 92/68   Pulse 77   Temp 36.8 °C (98.2 °F) (Temporal)   Resp 14   Ht 1.88 m (6' 2.02\")   Wt 104 kg (230 lb)   SpO2 97%  Body mass index is 29.52 kg/m².   Constitutional: Alert, no distress, well-groomed.  Skin: No rashes in visible areas.  Eye: Round. Conjunctiva clear, lids normal. No icterus.   ENMT: Lips pink without lesions, good dentition, moist mucous membranes. Phonation normal.  Neck: No masses, no thyromegaly. Moves freely without pain.  Respiratory: Unlabored respiratory effort, no cough or audible wheeze  Psych: Alert and oriented x3, normal affect and mood.    Results  Laboratory Studies   Latest Reference Range & Units 03/11/25 07:00   WBC 4.8 - 10.8 K/uL 6.2   RBC 4.70 - 6.10 M/uL 4.44 (L)   Hemoglobin 14.0 - 18.0 g/dL 15.7   Hematocrit 42.0 - 52.0 % 44.5   MCV 81.4 - 97.8 fL 100.2 (H)   MCH 27.0 - 33.0 pg 35.4 (H)   MCHC 32.3 - 36.5 g/dL 35.3   RDW 35.9 - 50.0 fL 44.7   Platelet Count 164 - 446 K/uL 173   MPV 9.0 - 12.9 fL 9.9   Sodium 135 - 145 mmol/L 141   Potassium 3.6 - 5.5 mmol/L 4.1   Chloride 96 - 112 mmol/L 107   Co2 20 - 33 mmol/L 23   Anion Gap 7.0 - 16.0  11.0   Glucose 65 - 99 mg/dL 114 (H)   Bun 8 - 22 mg/dL 18   Creatinine 0.50 - 1.40 mg/dL 1.02   GFR (CKD-EPI) >60 mL/min/1.73 m 2 80   Calcium 8.5 - 10.5 mg/dL 9.5   Correct Calcium 8.5 - " "10.5 mg/dL 9.3   AST(SGOT) 12 - 45 U/L 29   ALT(SGPT) 2 - 50 U/L 38   Alkaline Phosphatase 30 - 99 U/L 57   Total Bilirubin 0.1 - 1.5 mg/dL 1.1   Albumin 3.2 - 4.9 g/dL 4.3   Total Protein 6.0 - 8.2 g/dL 6.6   Globulin 1.9 - 3.5 g/dL 2.3   A-G Ratio g/dL 1.9   (L): Data is abnormally low  (H): Data is abnormally high    Assessment and Plan:     Assessment & Plan  1. Macrocytosis.  His MCV levels have been persistently elevated, while his hemoglobin and hematocrit levels remain within normal limits. The red blood cell count is slightly reduced, but this does not currently pose a health risk. The etiology of these findings could be multifactorial, potentially influenced by alcohol consumption or genetic predisposition. At present, there is no evidence to suggest that his condition is impacting his renal or hepatic function. He will continue his daily intake of vitamin B12 supplements. A comprehensive blood panel will be ordered to monitor his folate and vitamin B12 levels.    2.  Elevated blood glucose  His A1c level is currently at 5.2, which is within the normal range but indicative of prediabetes. His A1c level may be slightly lower than expected due to the altered morphology of his red blood cells. He has been advised to maintain a daily protein intake of at least 90 g, particularly in light of his exercise regimen. He has been counseled to avoid high-carbohydrate meals, especially during breakfast and late-night snacks, and to limit his consumption of processed foods. He has been encouraged to read \"Glucose Revolution\" by Tami Renodn for further dietary guidance.    3.  Erectile dysfunction  Chronic condition, unstable.  Given only moderate improvement with sildenafil 50 mg will go ahead and increase to 100 mg.  Discussed appropriate use, potential side effects, follow-up precautions..    Follow-up  The patient will follow up in 6 months.    Orders:  1. Macrocytosis  - VITAMIN B12; Future  - FOLATE; " Future    2. Blood glucose elevated  - POCT Hemoglobin A1C    3. Encounter for screening for malignant neoplasm of prostate  - PROSTATE SPECIFIC AG SCREENING; Future    4. Erectile dysfunction, unspecified erectile dysfunction type  - sildenafil citrate (VIAGRA) 100 MG tablet; Take 1 Tablet by mouth 1 time a day as needed for Erectile Dysfunction.  Dispense: 30 Tablet; Refill: 3        Followup: No follow-ups on file.         PLEASE NOTE: This dictation was created using voice recognition and [employer] ambient listening software. I have made every reasonable attempt to correct obvious errors, but I expect that there are errors of grammar and possibly content that I did not discover before finalizing the note.

## 2025-03-23 ENCOUNTER — PATIENT MESSAGE (OUTPATIENT)
Dept: MEDICAL GROUP | Facility: LAB | Age: 70
End: 2025-03-23
Payer: MEDICARE

## 2025-03-23 DIAGNOSIS — Z12.5 SCREENING FOR PROSTATE CANCER: ICD-10-CM

## 2025-03-23 DIAGNOSIS — E78.2 MIXED HYPERLIPIDEMIA: ICD-10-CM

## 2025-03-25 ENCOUNTER — PATIENT MESSAGE (OUTPATIENT)
Dept: MEDICAL GROUP | Facility: LAB | Age: 70
End: 2025-03-25

## 2025-03-25 DIAGNOSIS — R73.9 BLOOD GLUCOSE ELEVATED: ICD-10-CM

## 2025-03-25 DIAGNOSIS — I10 ESSENTIAL HYPERTENSION: ICD-10-CM

## 2025-03-26 DIAGNOSIS — E78.2 MIXED HYPERLIPIDEMIA: ICD-10-CM

## 2025-03-31 DIAGNOSIS — I10 ESSENTIAL HYPERTENSION: ICD-10-CM

## 2025-03-31 PROCEDURE — RXMED WILLOW AMBULATORY MEDICATION CHARGE: Performed by: FAMILY MEDICINE

## 2025-03-31 RX ORDER — LOSARTAN POTASSIUM 25 MG/1
25 TABLET ORAL DAILY
Qty: 100 TABLET | Refills: 3 | Status: SHIPPED | OUTPATIENT
Start: 2025-03-31 | End: 2026-03-26

## 2025-03-31 RX ORDER — ATORVASTATIN CALCIUM 40 MG/1
40 TABLET, FILM COATED ORAL DAILY
Qty: 100 TABLET | Refills: 3 | Status: SHIPPED | OUTPATIENT
Start: 2025-03-31

## 2025-04-01 ENCOUNTER — HOSPITAL ENCOUNTER (OUTPATIENT)
Dept: LAB | Facility: MEDICAL CENTER | Age: 70
End: 2025-04-01
Attending: FAMILY MEDICINE
Payer: MEDICARE

## 2025-04-01 DIAGNOSIS — E78.2 MIXED HYPERLIPIDEMIA: ICD-10-CM

## 2025-04-01 DIAGNOSIS — D75.89 MACROCYTOSIS: ICD-10-CM

## 2025-04-01 DIAGNOSIS — Z12.5 ENCOUNTER FOR SCREENING FOR MALIGNANT NEOPLASM OF PROSTATE: ICD-10-CM

## 2025-04-01 LAB
CHOLEST SERPL-MCNC: 99 MG/DL (ref 100–199)
FASTING STATUS PATIENT QL REPORTED: NORMAL
FOLATE SERPL-MCNC: 10.7 NG/ML
HDLC SERPL-MCNC: 40 MG/DL
LDLC SERPL CALC-MCNC: 44 MG/DL
PSA SERPL DL<=0.01 NG/ML-MCNC: 0.26 NG/ML (ref 0–4)
TRIGL SERPL-MCNC: 74 MG/DL (ref 0–149)
VIT B12 SERPL-MCNC: 349 PG/ML (ref 211–911)

## 2025-04-01 PROCEDURE — 82607 VITAMIN B-12: CPT

## 2025-04-01 PROCEDURE — 36415 COLL VENOUS BLD VENIPUNCTURE: CPT

## 2025-04-01 PROCEDURE — 82746 ASSAY OF FOLIC ACID SERUM: CPT

## 2025-04-01 PROCEDURE — 80061 LIPID PANEL: CPT

## 2025-04-01 PROCEDURE — 84153 ASSAY OF PSA TOTAL: CPT

## 2025-04-02 ENCOUNTER — PHARMACY VISIT (OUTPATIENT)
Dept: PHARMACY | Facility: MEDICAL CENTER | Age: 70
End: 2025-04-02
Payer: COMMERCIAL

## 2025-04-02 ENCOUNTER — RESULTS FOLLOW-UP (OUTPATIENT)
Dept: MEDICAL GROUP | Facility: LAB | Age: 70
End: 2025-04-02

## 2025-05-09 ENCOUNTER — OFFICE VISIT (OUTPATIENT)
Dept: URGENT CARE | Facility: CLINIC | Age: 70
End: 2025-05-09
Payer: MEDICARE

## 2025-05-09 ENCOUNTER — APPOINTMENT (OUTPATIENT)
Dept: RADIOLOGY | Facility: IMAGING CENTER | Age: 70
End: 2025-05-09
Payer: MEDICARE

## 2025-05-09 VITALS
SYSTOLIC BLOOD PRESSURE: 112 MMHG | DIASTOLIC BLOOD PRESSURE: 64 MMHG | RESPIRATION RATE: 16 BRPM | HEIGHT: 74 IN | OXYGEN SATURATION: 95 % | TEMPERATURE: 97.9 F | BODY MASS INDEX: 30.16 KG/M2 | HEART RATE: 61 BPM | WEIGHT: 235 LBS

## 2025-05-09 DIAGNOSIS — M70.31 BURSITIS OF RIGHT ELBOW, UNSPECIFIED BURSA: ICD-10-CM

## 2025-05-09 DIAGNOSIS — M70.31 BURSITIS OF RIGHT ELBOW, UNSPECIFIED BURSA: Primary | ICD-10-CM

## 2025-05-09 PROCEDURE — 3078F DIAST BP <80 MM HG: CPT

## 2025-05-09 PROCEDURE — 99214 OFFICE O/P EST MOD 30 MIN: CPT

## 2025-05-09 PROCEDURE — 73080 X-RAY EXAM OF ELBOW: CPT | Mod: TC,FY,RT | Performed by: RADIOLOGY

## 2025-05-09 PROCEDURE — 3074F SYST BP LT 130 MM HG: CPT

## 2025-05-09 RX ORDER — CEPHALEXIN 500 MG/1
500 CAPSULE ORAL 4 TIMES DAILY
Qty: 40 CAPSULE | Refills: 0 | Status: SHIPPED | OUTPATIENT
Start: 2025-05-09 | End: 2025-05-19

## 2025-05-09 RX ORDER — DOXYCYCLINE HYCLATE 100 MG
100 TABLET ORAL 2 TIMES DAILY
Qty: 20 TABLET | Refills: 0 | Status: SHIPPED | OUTPATIENT
Start: 2025-05-09 | End: 2025-05-19

## 2025-05-09 ASSESSMENT — FIBROSIS 4 INDEX: FIB4 SCORE: 1.82

## 2025-05-09 NOTE — PROGRESS NOTES
Subjective:   Aldair Winter is a 69 y.o. male who presents for Elbow Problem (Right elbow x6-7 days has been retaining fluid and is painful. Not sure what might have caused it)          I introduced myself to the patient and informed them that I am a Family Nurse Practitioner.    HPI:Jean is a 69 year-old male who comes in today c/o right elbow pain and swelling. Onset was 7 days ago. Patient describes symptoms as constant. They describe the pain as sharp, aching. Aggravating factors include using his arm, flexing the joint, touching the area. Relieving factors include none. Treatments tried at home include  Ibuprofen, ice with some relief . They describe their symptoms as moderate..Denies any F/C/N/V/D.  He denies any fall or trauma to the area.    Most recent lab work of 03/11/2025 shows good renal and hepatic function with GFR 80         Review of Systems   Constitutional:  Negative for chills, fever and malaise/fatigue.   HENT:  Negative for congestion, ear pain and sore throat.    Eyes:  Negative for pain, discharge and redness.   Respiratory:  Negative for cough, sputum production, shortness of breath, wheezing and stridor.    Cardiovascular:  Negative for chest pain and palpitations.   Gastrointestinal:  Negative for abdominal pain, diarrhea, nausea and vomiting.   Genitourinary:  Negative for dysuria.   Musculoskeletal:  Positive for joint pain. Negative for back pain and myalgias.   Skin:  Negative for rash.   Neurological:  Negative for dizziness and headaches.       Medications: albuterol Aers  aspirin  atorvastatin Tabs  gabapentin Caps  losartan Tabs  promethazine-dextromethorphan  sildenafil citrate  tamsulosin     Allergies: Patient has no known allergies.    Problem List: does not have any pertinent problems on file.    Surgical History:  No past surgical history on file.    Past Social Hx:   reports that he has never smoked. He has never used smokeless tobacco. He reports current alcohol use of  "about 3.6 oz of alcohol per week. He reports that he does not currently use drugs.     Past Family Hx:   family history includes Cancer in his father; Depression in his mother; No Known Problems in his brother, sister, and sister.     Problem list, medications, and allergies reviewed by myself today in Epic.   I have documented what I find to be significant in regards to past medical, social, family and surgical history  in my HPI or under PMH/PSH/FH review section, otherwise it is noncontributory     Objective:     /64 (BP Location: Left arm, Patient Position: Sitting, BP Cuff Size: Adult)   Pulse 61   Temp 36.6 °C (97.9 °F) (Temporal)   Resp 16   Ht 1.88 m (6' 2\")   Wt 107 kg (235 lb)   SpO2 95%   BMI 30.17 kg/m²     During this visit, appropriate PPE was worn, and hand hygiene was performed.    Physical Exam  Vitals reviewed.   Constitutional:       General: He is not in acute distress.     Appearance: Normal appearance. He is not ill-appearing or toxic-appearing.   HENT:      Head: Normocephalic and atraumatic.      Right Ear: External ear normal.      Left Ear: External ear normal.      Nose: No congestion or rhinorrhea.   Eyes:      General: No scleral icterus.        Right eye: No discharge.         Left eye: No discharge.      Extraocular Movements: Extraocular movements intact.      Conjunctiva/sclera: Conjunctivae normal.   Cardiovascular:      Rate and Rhythm: Normal rate.   Pulmonary:      Effort: Pulmonary effort is normal.   Abdominal:      General: There is no distension.      Palpations: Abdomen is soft.   Musculoskeletal:         General: Swelling and tenderness present. Normal range of motion.      Cervical back: Normal range of motion. No rigidity.      Right lower leg: No edema.      Left lower leg: No edema.      Comments: Right elbow with edema, erythema, TTP, restricted ROM due to pain.  There is no erythema, edema or any signs of cellulitis proximal to the elbow.  There are no " obvious bony deformities or step-offs palpated.  There are no open wounds or pustules, no drainage.  NVID with cap refill to distal digits less than 3 seconds, sensation intact hard and soft, radial and brachial pulses 2+   Skin:     General: Skin is warm and dry.      Capillary Refill: Capillary refill takes 2 to 3 seconds.      Coloration: Skin is not jaundiced.   Neurological:      General: No focal deficit present.      Mental Status: He is alert and oriented to person, place, and time. Mental status is at baseline.      Gait: Gait normal.   Psychiatric:         Mood and Affect: Mood normal.         Behavior: Behavior normal.         Thought Content: Thought content normal.         Judgment: Judgment normal.             RADIOLOGY RESULTS   DX-ELBOW-COMPLETE 3+ RIGHT  Result Date: 5/9/2025 5/9/2025 8:46 AM HISTORY/REASON FOR EXAM:  Right elbow pain and swelling TECHNIQUE/EXAM DESCRIPTION AND NUMBER OF VIEWS: 3 views of the RIGHT elbow. COMPARISON:  None FINDINGS:  Bone mineralization is normal.  There is no evidence of fracture or dislocation.  Soft tissues are normal.  There is joint space narrowing and periarticular sclerosis and marginal spurring.     1.  No evidence of fracture or dislocation. 2.  Findings are consistent with osteoarthritis.          Assessment/Plan:     Diagnosis and associated orders:     1. Bursitis of right elbow, unspecified bursa  DX-ELBOW-COMPLETE 3+ RIGHT         Comments/MDM:     1. Bursitis of right elbow, unspecified bursa (Primary)  Patient's presentation and symptoms as well as physical exam are consistent with elbow bursitis.  X-ray is negative for any bony injury or foreign bodies, does show some osteoarthritis.  Patient is afebrile, denies feeling ill or having any fever, chills, nausea or vomiting  discussed with patient Dx,  DDx, management options (risks,benefits, and alternatives to planned treatment), natural progression.   Supportive care measures were discussed  including RICE, Tylenol and ibuprofen for pain, discussed appropriate dosages  Will start patient on antibiotics, wrap elbow with Ace wrap, make referral to orthopedics for follow-up.   Will start patient on Keflex, and doxycycline, will avoid Bactrim due to patient taking an ARB which may elevate potassium.  Questions were encouraged and answered.   Written information was provided and I did go over this with the patient in clinic today.  Instructed patient regarding red flags and to return to urgent care prn if new or worsening sx or if there is no improvement in condition prn.    Advised the patient to follow-up with the primary care physician for recheck, reevaluation, and consideration of further management.  I did instruct patient regarding medications prescribed, purpose, side effects, precautions.  Instructed patient to get a pharmacy consult when picking up any prescribed medications.  Strict ER precautions discussed for any worsening symptoms, increased redness, swelling, pain, warm to touch, fever, chills, nausea or vomiting, lethargy   Patient states they have good understanding and they are agreeable with the plan of care.     - DX-ELBOW-COMPLETE 3+ RIGHT; Future  - Referral to Orthopedics  - cephALEXin (KEFLEX) 500 MG Cap; Take 1 Capsule by mouth 4 times a day for 10 days.  Dispense: 40 Capsule; Refill: 0  - doxycycline (VIBRAMYCIN) 100 MG Tab; Take 1 Tablet by mouth 2 times a day for 10 days.  Dispense: 20 Tablet; Refill: 0          Pt is clinically stable at today's acute urgent care visit. Vital signs are normal and reassuring.  No acute distress noted. Appropriate for outpatient management at this time.        I personally reviewed prior external notes and test results pertinent to today's visit.  I have independently reviewed and interpreted all diagnostics ordered during this urgent care acute visit.        Please note that this dictation was created using voice recognition software. I have made  a reasonable attempt to correct obvious errors, but I expect that there are errors of grammar and possibly content that I did not discover before finalizing the note.    This note was electronically signed by PEE Palm, NIECY, CARLOS

## 2025-05-12 ENCOUNTER — APPOINTMENT (OUTPATIENT)
Dept: MEDICAL GROUP | Facility: LAB | Age: 70
End: 2025-05-12
Payer: MEDICARE

## 2025-05-13 ASSESSMENT — ENCOUNTER SYMPTOMS
COUGH: 0
CHILLS: 0
EYE REDNESS: 0
MYALGIAS: 0
WHEEZING: 0
DIARRHEA: 0
VOMITING: 0
SORE THROAT: 0
PALPITATIONS: 0
ABDOMINAL PAIN: 0
STRIDOR: 0
FEVER: 0
BACK PAIN: 0
HEADACHES: 0
EYE PAIN: 0
EYE DISCHARGE: 0
DIZZINESS: 0
SHORTNESS OF BREATH: 0
SPUTUM PRODUCTION: 0
NAUSEA: 0

## 2025-07-02 ENCOUNTER — PHARMACY VISIT (OUTPATIENT)
Dept: PHARMACY | Facility: MEDICAL CENTER | Age: 70
End: 2025-07-02
Payer: COMMERCIAL

## 2025-07-02 PROCEDURE — RXMED WILLOW AMBULATORY MEDICATION CHARGE: Performed by: FAMILY MEDICINE

## 2025-07-15 PROCEDURE — RXMED WILLOW AMBULATORY MEDICATION CHARGE: Performed by: FAMILY MEDICINE

## 2025-07-17 ENCOUNTER — PHARMACY VISIT (OUTPATIENT)
Dept: PHARMACY | Facility: MEDICAL CENTER | Age: 70
End: 2025-07-17
Payer: COMMERCIAL